# Patient Record
Sex: MALE | NOT HISPANIC OR LATINO | Employment: OTHER | URBAN - METROPOLITAN AREA
[De-identification: names, ages, dates, MRNs, and addresses within clinical notes are randomized per-mention and may not be internally consistent; named-entity substitution may affect disease eponyms.]

---

## 2018-03-06 ENCOUNTER — OFFICE VISIT (OUTPATIENT)
Dept: MEDICAL GROUP | Facility: PHYSICIAN GROUP | Age: 68
End: 2018-03-06
Payer: COMMERCIAL

## 2018-03-06 VITALS
OXYGEN SATURATION: 94 % | TEMPERATURE: 99.1 F | SYSTOLIC BLOOD PRESSURE: 132 MMHG | RESPIRATION RATE: 16 BRPM | WEIGHT: 274 LBS | HEIGHT: 73 IN | BODY MASS INDEX: 36.31 KG/M2 | DIASTOLIC BLOOD PRESSURE: 78 MMHG | HEART RATE: 64 BPM

## 2018-03-06 DIAGNOSIS — M25.552 HIP PAIN, ACUTE, LEFT: ICD-10-CM

## 2018-03-06 DIAGNOSIS — E66.9 OBESITY (BMI 35.0-39.9 WITHOUT COMORBIDITY): ICD-10-CM

## 2018-03-06 DIAGNOSIS — G89.29 CHRONIC MIDLINE LOW BACK PAIN WITH BILATERAL SCIATICA: ICD-10-CM

## 2018-03-06 DIAGNOSIS — Z13.9 ENCOUNTER FOR SCREENING: ICD-10-CM

## 2018-03-06 DIAGNOSIS — M54.42 CHRONIC MIDLINE LOW BACK PAIN WITH BILATERAL SCIATICA: ICD-10-CM

## 2018-03-06 DIAGNOSIS — M17.2 POST-TRAUMATIC OSTEOARTHRITIS OF BOTH KNEES: ICD-10-CM

## 2018-03-06 DIAGNOSIS — Z96.651 HISTORY OF TOTAL RIGHT KNEE REPLACEMENT: ICD-10-CM

## 2018-03-06 DIAGNOSIS — Z00.00 HEALTHCARE MAINTENANCE: ICD-10-CM

## 2018-03-06 DIAGNOSIS — Z12.5 SCREENING PSA (PROSTATE SPECIFIC ANTIGEN): ICD-10-CM

## 2018-03-06 DIAGNOSIS — Z12.11 COLON CANCER SCREENING: ICD-10-CM

## 2018-03-06 DIAGNOSIS — I10 ESSENTIAL HYPERTENSION: ICD-10-CM

## 2018-03-06 DIAGNOSIS — M54.41 CHRONIC MIDLINE LOW BACK PAIN WITH BILATERAL SCIATICA: ICD-10-CM

## 2018-03-06 PROBLEM — M54.40 CHRONIC MIDLINE LOW BACK PAIN WITH SCIATICA: Status: ACTIVE | Noted: 2018-03-06

## 2018-03-06 PROBLEM — Z96.659 HX OF TOTAL KNEE ARTHROPLASTY: Status: ACTIVE | Noted: 2018-03-06

## 2018-03-06 PROCEDURE — 99204 OFFICE O/P NEW MOD 45 MIN: CPT | Performed by: INTERNAL MEDICINE

## 2018-03-06 RX ORDER — LISINOPRIL 20 MG/1
20 TABLET ORAL 2 TIMES DAILY
COMMUNITY
End: 2018-03-06

## 2018-03-06 RX ORDER — NAPROXEN 375 MG/1
375 TABLET ORAL 2 TIMES DAILY WITH MEALS
Qty: 60 TAB | Refills: 0 | Status: SHIPPED | OUTPATIENT
Start: 2018-03-06 | End: 2018-03-10

## 2018-03-06 RX ORDER — LISINOPRIL AND HYDROCHLOROTHIAZIDE 20; 12.5 MG/1; MG/1
2 TABLET ORAL DAILY
Qty: 90 TAB | Refills: 3 | Status: SHIPPED | OUTPATIENT
Start: 2018-03-06 | End: 2019-02-03 | Stop reason: SDUPTHER

## 2018-03-06 ASSESSMENT — PATIENT HEALTH QUESTIONNAIRE - PHQ9: CLINICAL INTERPRETATION OF PHQ2 SCORE: 0

## 2018-03-06 NOTE — ASSESSMENT & PLAN NOTE
This is a chronic health problem that is not well controlled with current medications and lifestyle measures. He had multiple right knee procedures due to fall injuries during sports when he was young, which all lead to early osteoarthritis and total knee replacement in 2002 on the right side. After which he had relief of his Rt knee pain for more than 10 years but currently he started having recurrent pain on the right side. After a fall injury during [place] time he started experiencing pain in left knee 12/2017 . His fall injury on step ladder and direct trauma on left knee at that time. History taking over-the-counter Tylenol as needed but wants them to get fixed and requests for referral to orthopedics. He was previously following Dr. Keith orthopedist who is no longer practicing now at his office.

## 2018-03-06 NOTE — ASSESSMENT & PLAN NOTE
This is a chronic health problem that is uncontrolled with current medications and lifestyle measures. Had epidurals x 4/year for 6-7 years.   , [facility] . One time he received steroid shots which she feels well most effective for his pain and would prefer to have similar such targeted steroid injections instead of epidurals. But after the fall injury in December 2017 patient started experiencing back pain with radiating pain in both the thighs. Patient says that he had MRI of the back done with Dr. Keith at [facility] which we'll have to get the records before further imaging on his back.

## 2018-03-06 NOTE — ASSESSMENT & PLAN NOTE
This is a new problem which patient has started having after the fall injury in December 2017 and currently having difficulty moving around and lying on the left side while he sleeps.

## 2018-03-06 NOTE — LETTER
[facility]  Louise Muñoz M.D.  [address]  [place] [address]  Fax: [phone]   Authorization for Release/Disclosure of   Protected Health Information   Name: ALLIE VELAZQUEZ : 1950 SSN: [id number]   Address: [address] [place] [address]  [place] [address] Phone:    [phone] (home)    I authorize the entity listed below to release/disclose the PHI below to:   [facility]/Louise Muñoz M.D. and Louise Muñoz M.D.   Provider or Entity Name:     Address   City, State, Zip   Phone:      Fax:     Reason for request: continuity of care   Information to be released:    [  ] LAST COLONOSCOPY,  including any PATH REPORT and follow-up  [  ] LAST FIT/COLOGUARD RESULT [  ] LAST DEXA  [  ] LAST MAMMOGRAM  [  ] LAST PAP  [  ] LAST LABS [  ] RETINA EXAM REPORT  [  ] IMMUNIZATION RECORDS  [  ] Release all info      [  ] Check here and initial the line next to each item to release ALL health information INCLUDING  _____ Care and treatment for drug and / or alcohol abuse  _____ HIV testing, infection status, or AIDS  _____ Genetic Testing    DATES OF SERVICE OR TIME PERIOD TO BE DISCLOSED: _____________  I understand and acknowledge that:  * This Authorization may be revoked at any time by you in writing, except if your health information has already been used or disclosed.  * Your health information that will be used or disclosed as a result of you signing this authorization could be re-disclosed by the recipient. If this occurs, your re-disclosed health information may no longer be protected by State or Federal laws.  * You may refuse to sign this Authorization. Your refusal will not affect your ability to obtain treatment.  * This Authorization becomes effective upon signing and will  on (date) __________.      If no date is indicated, this Authorization will  one (1) year from the signature date.    Name: Allie Velazquez    Signature:   Date:     3/6/2018       PLEASE FAX  REQUESTED RECORDS BACK TO: [phone]

## 2018-03-06 NOTE — LETTER
[facility]  Louise Muñoz M.D.  [address]  [place] [address]  Fax: [phone]   Authorization for Release/Disclosure of   Protected Health Information   Name: ALLIE VELAZQUEZ : 1950 SSN: [id number]   Address: [address] [place] [address]  [place] [address] Phone:    [phone] (home)    I authorize the entity listed below to release/disclose the PHI below to:   [facility]/Louise Muñoz M.D. and Louise Muñoz M.D.   Provider or Entity Name:  [facility], [facility], [facility]   Phone:      Fax:     Reason for request: continuity of care   Information to be released:    [  ] LAST COLONOSCOPY,  including any PATH REPORT and follow-up  [  ] LAST FIT/COLOGUARD RESULT [  ] LAST DEXA  [  ] LAST MAMMOGRAM  [  ] LAST PAP  [  ] LAST LABS [  ] RETINA EXAM REPORT  [  ] IMMUNIZATION RECORDS  [  ] Release all info      [  ] Check here and initial the line next to each item to release ALL health information INCLUDING  _____ Care and treatment for drug and / or alcohol abuse  _____ HIV testing, infection status, or AIDS  _____ Genetic Testing    DATES OF SERVICE OR TIME PERIOD TO BE DISCLOSED: _____________  I understand and acknowledge that:  * This Authorization may be revoked at any time by you in writing, except if your health information has already been used or disclosed.  * Your health information that will be used or disclosed as a result of you signing this authorization could be re-disclosed by the recipient. If this occurs, your re-disclosed health information may no longer be protected by State or Federal laws.  * You may refuse to sign this Authorization. Your refusal will not affect your ability to obtain treatment.  * This Authorization becomes effective upon signing and will  on (date) __________.      If no date is indicated, this Authorization will  one (1) year from the signature date.    Name: Allie Velazquez    Signature:   Date:     3/6/2018       PLEASE  FAX REQUESTED RECORDS BACK TO: [phone]

## 2018-03-06 NOTE — ASSESSMENT & PLAN NOTE
He recently attended a health fair and had all the basic lab work done in February 2018. CMP within normal limits with creatinine at 1.2, AST 33, ALP 39. HBA1c 6.0, lipid panel showing total cholesterol 197, triglycerides 172, HDL 46, .    Refuses to take flu shot. At 53 yrs he had first colonoscopy done at that time normal. ?[facility] outpt clinic . He does not remember the doctor's name and we're unable to get the records for the report to be verified. Discussed with the patient that he will need a repeat colonoscopy to be done at 63 years of age which he refuses to have one at this time because he has to deal with all his knee pain, back pain at this time and is not interested to get the colonoscopy. Agreed for a fit test.

## 2018-03-06 NOTE — LETTER
[facility]  Louise Muñoz M.D.  [address]  [place] [address]  Fax: [phone]   Authorization for Release/Disclosure of   Protected Health Information   Name: ALLIE VELAZQUEZ : 1950 SSN: [id number]   Address: [address] [place] [address]  [place] [address] Phone:    [phone] (home)    I authorize the entity listed below to release/disclose the PHI below to:   [facility]/Louise Muñoz M.D. and Louise Muñoz M.D.   Provider or Entity Name:     Address   City, State, Zip   Phone:      Fax:     Reason for request: continuity of care   Information to be released:    [  ] LAST COLONOSCOPY,  including any PATH REPORT and follow-up  [  ] LAST FIT/COLOGUARD RESULT [  ] LAST DEXA  [  ] LAST MAMMOGRAM  [  ] LAST PAP  [  ] LAST LABS [  ] RETINA EXAM REPORT  [  ] IMMUNIZATION RECORDS  [  ] Release all info      [  ] Check here and initial the line next to each item to release ALL health information INCLUDING  _____ Care and treatment for drug and / or alcohol abuse  _____ HIV testing, infection status, or AIDS  _____ Genetic Testing    DATES OF SERVICE OR TIME PERIOD TO BE DISCLOSED: _____________  I understand and acknowledge that:  * This Authorization may be revoked at any time by you in writing, except if your health information has already been used or disclosed.  * Your health information that will be used or disclosed as a result of you signing this authorization could be re-disclosed by the recipient. If this occurs, your re-disclosed health information may no longer be protected by State or Federal laws.  * You may refuse to sign this Authorization. Your refusal will not affect your ability to obtain treatment.  * This Authorization becomes effective upon signing and will  on (date) __________.      If no date is indicated, this Authorization will  one (1) year from the signature date.    Name: Allie Velazquez    Signature:   Date:     3/6/2018       PLEASE FAX  REQUESTED RECORDS BACK TO: [phone]

## 2018-03-06 NOTE — ASSESSMENT & PLAN NOTE
This is a chronic health problem that is well controlled with current medications and lifestyle measures. 's/70's at home check. Denies any symptoms of headache, blurry vision, focal weakness. Currently on medication lisinopril/hydrochlorothiazide at 40/25 mg.

## 2018-03-06 NOTE — ASSESSMENT & PLAN NOTE
This is a chronic health problem that is well controlled with current medications and lifestyle measures. We do not have previous vital signs to compare his weight but currently his BMI is 36. Patient understands the importance of weight reduction at this time given his comorbidities and agrees to get the referral to help improvement  program.

## 2018-03-06 NOTE — LETTER
[facility]  Louise Muñoz M.D.  [address]  [place] [address]  Fax: [phone]   Authorization for Release/Disclosure of   Protected Health Information   Name: ALLIE VELAZQUEZ : 1950 SSN: [id number]   Address: [address] [place] [address]  [place] [address] Phone:    [phone] (home)    I authorize the entity listed below to release/disclose the PHI below to:   [facility]/Louise Muñoz M.D. and Louise Muñoz M.D.   Provider or Entity Name:     Address   City, State, Zip   Phone:      Fax:     Reason for request: continuity of care   Information to be released:    [  ] LAST COLONOSCOPY,  including any PATH REPORT and follow-up  [  ] LAST FIT/COLOGUARD RESULT [  ] LAST DEXA  [  ] LAST MAMMOGRAM  [  ] LAST PAP  [  ] LAST LABS [  ] RETINA EXAM REPORT  [  ] IMMUNIZATION RECORDS  [  ] Release all info      [  ] Check here and initial the line next to each item to release ALL health information INCLUDING  _____ Care and treatment for drug and / or alcohol abuse  _____ HIV testing, infection status, or AIDS  _____ Genetic Testing    DATES OF SERVICE OR TIME PERIOD TO BE DISCLOSED: _____________  I understand and acknowledge that:  * This Authorization may be revoked at any time by you in writing, except if your health information has already been used or disclosed.  * Your health information that will be used or disclosed as a result of you signing this authorization could be re-disclosed by the recipient. If this occurs, your re-disclosed health information may no longer be protected by State or Federal laws.  * You may refuse to sign this Authorization. Your refusal will not affect your ability to obtain treatment.  * This Authorization becomes effective upon signing and will  on (date) __________.      If no date is indicated, this Authorization will  one (1) year from the signature date.    Name: Allie Velazquez    Signature:   Date:     3/6/2018       PLEASE FAX  REQUESTED RECORDS BACK TO: [phone]

## 2018-03-06 NOTE — PROGRESS NOTES
CC:  To establish care with new PCP, knee pain.    HISTORY OF THE PRESENT ILLNESS: Patient is a 68 y.o. male. This pleasant patient is here today to establish care with new PCP, complaints of bilateral knee pain and other medical problems as mentioned in history of present illness below.    Health Maintenance: Completed      Hx of total knee arthroplasty  This is a chronic health problem that is not well controlled with current medications and lifestyle measures. He had multiple right knee procedures due to fall injuries during sports when he was young, which all lead to early osteoarthritis and total knee replacement in 2002 on the right side. After which he had relief of his Rt knee pain for more than 10 years but currently he started having recurrent pain on the right side. After a fall injury during [place] time he started experiencing pain in left knee 12/2017 . His fall injury on step ladder and direct trauma on left knee at that time. History taking over-the-counter Tylenol as needed but wants them to get fixed and requests for referral to orthopedics. He was previously following Dr. Keith orthopedist who is no longer practicing now at his office.    Chronic midline low back pain with sciatica  This is a chronic health problem that is uncontrolled with current medications and lifestyle measures. Had epidurals x 4/year for 6-7 years.   , [facility] . One time he received steroid shots which she feels well most effective for his pain and would prefer to have similar such targeted steroid injections instead of epidurals. But after the fall injury in December 2017 patient started experiencing back pain with radiating pain in both the thighs. Patient says that he had MRI of the back done with Dr. Keith at [facility] which we'll have to get the records before further imaging on his back.    Essential hypertension  This is a chronic health problem that is well controlled with current  medications and lifestyle measures. 's/70's at home check. Denies any symptoms of headache, blurry vision, focal weakness. Currently on medication lisinopril/hydrochlorothiazide at 40/25 mg.      Healthcare maintenance  He recently attended a health fair and had all the basic lab work done in February 2018. CMP within normal limits with creatinine at 1.2, AST 33, ALP 39. HBA1c 6.0, lipid panel showing total cholesterol 197, triglycerides 172, HDL 46, .    Refuses to take flu shot. At 53 yrs he had first colonoscopy done at that time normal. ?[facility] out[facility] clinic . He does not remember the doctor's name and we're unable to get the records for the report to be verified. Discussed with the patient that he will need a repeat colonoscopy to be done at 63 years of age which he refuses to have one at this time because he has to deal with all his knee pain, back pain at this time and is not interested to get the colonoscopy. Agreed for a fit test.     Obesity (BMI 35.0-39.9 without comorbidity) (HCC)  This is a chronic health problem that is well controlled with current medications and lifestyle measures. We do not have previous vital signs to compare his weight but currently his BMI is 36. Patient understands the importance of weight reduction at this time given his comorbidities and agrees to get the referral to help improvement  program.      Hip pain, acute, left  This is a new problem which patient has started having after the fall injury in December 2017 and currently having difficulty moving around and lying on the left side while he sleeps.    PHQ score 0, BMI within normal limits, no tobacco, no fall injuries    Allergies: Patient has no known allergies.    Current Outpatient Prescriptions Ordered in [facility]   Medication Sig Dispense Refill   • lisinopril-hydrochlorothiazide (PRINZIDE, ZESTORETIC) 20-12.5 MG per tablet Take 2 Tabs by mouth every day. 90 Tab 3     No current Epic-ordered  facility-administered medications on file.        Past Medical History:   Diagnosis Date   • Chronic midline low back pain with sciatica 3/6/2018   • Essential hypertension 3/6/2018   • Hx of total knee arthroplasty 03/06/2018   • Hypertension    • Rotator cuff arthropathy, right 2009    Metal in place.   • S/P total knee arthroplasty Rt side , 2002    He was told that the life expectancy of the arthroplasty is 15-20 yrs       No past surgical history on file.    Social History   Substance Use Topics   • Smoking status: Light Tobacco Smoker     Types: Cigars   • Smokeless tobacco: Never Used      Comment: 4 cigars/year   • Alcohol use Yes      Comment: Cocktails of 2 -3 drinks around 5-6x/week       Social History     Social History Narrative   • No narrative on file       Family History   Problem Relation Age of Onset   • No Known Problems Sister    • No Known Problems Maternal Grandmother    • No Known Problems Maternal Grandfather    • No Known Problems Paternal Grandmother    • No Known Problems Paternal Grandfather        ROS:     - Constitutional: Negative for fever, chills, unexpected weight change, and fatigue/generalized weakness.     - HEENT: Negative for headaches, vision changes, hearing changes, ear pain, ear discharge, rhinorrhea, sinus congestion, sore throat, and neck pain.      - Respiratory: Negative for cough, sputum production, chest congestion, dyspnea, wheezing, and crackles.      - Cardiovascular: Negative for chest pain, palpitations, orthopnea, and bilateral lower extremity edema.     - Gastrointestinal: Negative for heartburn, nausea, vomiting, abdominal pain, hematochezia, melena, diarrhea, constipation, and greasy/foul-smelling stools.     - Genitourinary: Negative for dysuria, polyuria, hematuria, pyuria, urinary urgency, and urinary incontinence.     - Musculoskeletal: Positive for bilateral knee joint pain, hip pain on the left side, low back pain. Negative for myalgias.    - Skin:  "Negative for rash, itching, cyanotic skin color change.     - Neurological: Negative for dizziness, tingling, tremors, focal sensory deficit, focal weakness and headaches.     - Endo/Heme/Allergies: Does not bruise/bleed easily.     - Psychiatric/Behavioral: Negative for depression, suicidal/homicidal ideation and memory loss.      Do not see any lab work done on Epic patient says he had it with above past PCP will get the records.     Exam: Blood pressure 132/78, pulse 64, temperature 37.3 °C (99.1 °F), resp. rate 16, height 1.854 m (6' 1\"), weight 124.3 kg (274 lb), SpO2 94 %. Body mass index is 36.15 kg/m².    General: Normal appearing. No distress.  HEENT: Normocephalic. Eyes conjunctiva clear lids without ptosis, pupils equal and reactive to light accommodation, ears normal shape and contour, canals are clear bilaterally, tympanic membranes are benign, nasal mucosa benign, oropharynx is without erythema, edema or exudates.   Neck: Supple without JVD or bruit. Thyroid is not enlarged.  Pulmonary: Clear to ausculation.  Normal effort. No rales, ronchi, or wheezing.  Cardiovascular: Regular rate and rhythm without murmur. Carotid and radial pulses are intact and equal bilaterally.  Abdomen: Soft, nontender, nondistended. Normal bowel sounds. Liver and spleen are not palpable  Neurologic: Grossly nonfocal  Lymph: No cervical, supraclavicular or axillary lymph nodes are palpable  Skin: Warm and dry.  No obvious lesions.  Musculoskeletal: Normal gait. No extremity cyanosis, clubbing, or edema.  Knee exam : Restricted range of motion of bilateral knee joints.  positive exam for Rt and Left Knee including (Rodney's, anterior/posterior drawer, patellar tendon tenderness to compression, quadriceps tendon tenderness to compression, tenderness to direct palpation of patella, tenderness to direct palpation of medial and lateral joint spaces, tenderness to direct palpation of medial and lateral collateral ligaments, " patellar mobility)  Left Hip Exam : Restricted range of motion on all movements, tenderness on palpation of the greater trochanter.  Back exam: Localized tenderness on palpation of lumbosacral spine in midline, straight leg raising test positive on left side at 45°.    Psych: Normal mood and affect. Alert and oriented x3. Judgment and insight is normal.      Please note that this dictation was created using voice recognition software. I have made every reasonable attempt to correct obvious errors, but I expect that there are errors of grammar and possibly content that I did not discover before finalizing the note.      Assessment/Plan  1. History of total right knee replacement  2. Post-traumatic osteoarthritis of both knees  Given previous history of right total knee replacement, recurrence of knee joint pain on his right side patient will need reevaluation by orthopedics, will give referral to orthopedics. Will do the imaging for his both the knees, give him when necessary naproxen and also check his vitamin D levels.  - REFERRAL TO ORTHOPEDICS  - VITAMIN D,25 HYDROXY; Future  - DX-KNEE 3 VIEWS LEFT; Future  - DX-KNEE 3 VIEWS RIGHT; Future  - naproxen (NAPROSYN) 375 MG Tab; Take 1 Tab by mouth 2 times a day, with meals.  Dispense: 60 Tab; Refill: 0    2. Chronic midline low back pain with bilateral sciatica  Given his previous history of repeated epidural injections, as mentioned in the history of present illness above patient is more happy with a steroid injections which worked well in the past. We will give referral to spine surgery for further management. We will evaluate with an x-ray of the hips covering the lower spine.  - DX-HIP-BILATERAL-WITH PELVIS-2 VIEWS; Future  - REFERRAL TO SPINE SURGERY  - naproxen (NAPROSYN) 375 MG Tab; Take 1 Tab by mouth 2 times a day, with meals.  Dispense: 60 Tab; Refill: 0    3. Essential hypertension  To continue the medications lisinopril/HCTZ at current dose of 40/25 mg.  Today his blood pressure regularly everyday and share it with me on phone for any changes in his current dosages. Patient's blood pressure in the office today is well controlled.  - lisinopril-hydrochlorothiazide (PRINZIDE, ZESTORETIC) 20-12.5 MG per tablet; Take 2 Tabs by mouth every day.  Dispense: 90 Tab; Refill: 3    4. Healthcare maintenance  6. Encounter for screening  - CBC WITH DIFFERENTIAL; Future    7. Screening PSA (prostate specific antigen)  - PROSTATE SPECIFIC AG SCREENING; Future    8. Colon cancer screening  - OCCULT BLOOD FECES IMMUNOASSAY; Future      9. Obesity (BMI 35.0-39.9 without comorbidity)  Pt educated on the increase of morbidity and mortality associated with excess weight including DM, Heart Disease, HTN, stroke, and sleep apnea.  Pt advised weight loss of 5% through reduced calorie, low fat diet and 150 mins of exercise a week  Recommend obesity clinic if patient is unsuccessful with weight loss. Will check his TSH levels.  - Patient identified as having weight management issue.  Appropriate orders and counseling given.  - REFERRAL TO [facility] IMPROVEMENT PROGRAMS (HIP) Services Requested: Weight Management Program; Reason for Visit: Overweight/Obesity; Future  - TSH WITH REFLEX TO FT4; Future      10. Hip pain, acute, left   Posttraumatic since December 2017, will do a basic imaging and use when necessary naproxen.  - DX-HIP-BILATERAL-WITH PELVIS-2 VIEWS; Future  - naproxen (NAPROSYN) 375 MG Tab; Take 1 Tab by mouth 2 times a day, with meals.  Dispense: 60 Tab; Refill: 0

## 2018-03-06 NOTE — LETTER
[facility]  Louise Muñoz M.D.  [address]  [place] [address]  Fax: [phone]   Authorization for Release/Disclosure of   Protected Health Information   Name: ALLIE VELAZQUEZ : 1950 SSN: [id number]   Address: [address] [place] [address]  [place] [address] Phone:    [phone] (home)    I authorize the entity listed below to release/disclose the PHI below to:   [facility]/Louise Muñoz M.D. and Louise Muñoz M.D.   Provider or Entity Name:     Address   City, State, Zip   Phone:      Fax:     Reason for request: continuity of care   Information to be released:    [  ] LAST COLONOSCOPY,  including any PATH REPORT and follow-up  [  ] LAST FIT/COLOGUARD RESULT [  ] LAST DEXA  [  ] LAST MAMMOGRAM  [  ] LAST PAP  [  ] LAST LABS [  ] RETINA EXAM REPORT  [  ] IMMUNIZATION RECORDS  [  ] Release all info      [  ] Check here and initial the line next to each item to release ALL health information INCLUDING  _____ Care and treatment for drug and / or alcohol abuse  _____ HIV testing, infection status, or AIDS  _____ Genetic Testing    DATES OF SERVICE OR TIME PERIOD TO BE DISCLOSED: _____________  I understand and acknowledge that:  * This Authorization may be revoked at any time by you in writing, except if your health information has already been used or disclosed.  * Your health information that will be used or disclosed as a result of you signing this authorization could be re-disclosed by the recipient. If this occurs, your re-disclosed health information may no longer be protected by State or Federal laws.  * You may refuse to sign this Authorization. Your refusal will not affect your ability to obtain treatment.  * This Authorization becomes effective upon signing and will  on (date) __________.      If no date is indicated, this Authorization will  one (1) year from the signature date.    Name: Allie Velazquez    Signature:   Date:     3/6/2018       PLEASE FAX  REQUESTED RECORDS BACK TO: [phone]

## 2018-03-08 ENCOUNTER — APPOINTMENT (OUTPATIENT)
Dept: RADIOLOGY | Facility: IMAGING CENTER | Age: 68
End: 2018-03-08
Attending: INTERNAL MEDICINE
Payer: COMMERCIAL

## 2018-03-08 ENCOUNTER — HOSPITAL ENCOUNTER (OUTPATIENT)
Facility: MEDICAL CENTER | Age: 68
End: 2018-03-08
Attending: INTERNAL MEDICINE
Payer: COMMERCIAL

## 2018-03-08 ENCOUNTER — HOSPITAL ENCOUNTER (OUTPATIENT)
Dept: LAB | Facility: MEDICAL CENTER | Age: 68
End: 2018-03-08
Attending: INTERNAL MEDICINE
Payer: COMMERCIAL

## 2018-03-08 DIAGNOSIS — Z12.5 SCREENING PSA (PROSTATE SPECIFIC ANTIGEN): ICD-10-CM

## 2018-03-08 DIAGNOSIS — M17.2 POST-TRAUMATIC OSTEOARTHRITIS OF BOTH KNEES: ICD-10-CM

## 2018-03-08 DIAGNOSIS — Z12.11 COLON CANCER SCREENING: ICD-10-CM

## 2018-03-08 DIAGNOSIS — E66.9 OBESITY (BMI 35.0-39.9 WITHOUT COMORBIDITY): ICD-10-CM

## 2018-03-08 DIAGNOSIS — G89.29 CHRONIC MIDLINE LOW BACK PAIN WITH BILATERAL SCIATICA: ICD-10-CM

## 2018-03-08 DIAGNOSIS — M54.42 CHRONIC MIDLINE LOW BACK PAIN WITH BILATERAL SCIATICA: ICD-10-CM

## 2018-03-08 DIAGNOSIS — M25.552 HIP PAIN, ACUTE, LEFT: ICD-10-CM

## 2018-03-08 DIAGNOSIS — Z13.9 ENCOUNTER FOR SCREENING: ICD-10-CM

## 2018-03-08 DIAGNOSIS — M54.41 CHRONIC MIDLINE LOW BACK PAIN WITH BILATERAL SCIATICA: ICD-10-CM

## 2018-03-08 LAB
25(OH)D3 SERPL-MCNC: 20 NG/ML (ref 30–100)
BASOPHILS # BLD AUTO: 1.8 % (ref 0–1.8)
BASOPHILS # BLD: 0.12 K/UL (ref 0–0.12)
EOSINOPHIL # BLD AUTO: 0.6 K/UL (ref 0–0.51)
EOSINOPHIL NFR BLD: 9 % (ref 0–6.9)
ERYTHROCYTE [DISTWIDTH] IN BLOOD BY AUTOMATED COUNT: 43.7 FL (ref 35.9–50)
HCT VFR BLD AUTO: 49.8 % (ref 42–52)
HGB BLD-MCNC: 17 G/DL (ref 14–18)
IMM GRANULOCYTES # BLD AUTO: 0.02 K/UL (ref 0–0.11)
IMM GRANULOCYTES NFR BLD AUTO: 0.3 % (ref 0–0.9)
LYMPHOCYTES # BLD AUTO: 2.53 K/UL (ref 1–4.8)
LYMPHOCYTES NFR BLD: 37.9 % (ref 22–41)
MCH RBC QN AUTO: 32.8 PG (ref 27–33)
MCHC RBC AUTO-ENTMCNC: 34.1 G/DL (ref 33.7–35.3)
MCV RBC AUTO: 96.1 FL (ref 81.4–97.8)
MONOCYTES # BLD AUTO: 0.74 K/UL (ref 0–0.85)
MONOCYTES NFR BLD AUTO: 11.1 % (ref 0–13.4)
NEUTROPHILS # BLD AUTO: 2.67 K/UL (ref 1.82–7.42)
NEUTROPHILS NFR BLD: 39.9 % (ref 44–72)
NRBC # BLD AUTO: 0 K/UL
NRBC BLD-RTO: 0 /100 WBC
PLATELET # BLD AUTO: 287 K/UL (ref 164–446)
PMV BLD AUTO: 10.2 FL (ref 9–12.9)
PSA SERPL-MCNC: 2.29 NG/ML (ref 0–4)
RBC # BLD AUTO: 5.18 M/UL (ref 4.7–6.1)
TSH SERPL DL<=0.005 MIU/L-ACNC: 2.74 UIU/ML (ref 0.38–5.33)
WBC # BLD AUTO: 6.7 K/UL (ref 4.8–10.8)

## 2018-03-08 PROCEDURE — 85025 COMPLETE CBC W/AUTO DIFF WBC: CPT

## 2018-03-08 PROCEDURE — 84443 ASSAY THYROID STIM HORMONE: CPT

## 2018-03-08 PROCEDURE — 73521 X-RAY EXAM HIPS BI 2 VIEWS: CPT | Mod: TC | Performed by: INTERNAL MEDICINE

## 2018-03-08 PROCEDURE — 36415 COLL VENOUS BLD VENIPUNCTURE: CPT

## 2018-03-08 PROCEDURE — 82274 ASSAY TEST FOR BLOOD FECAL: CPT

## 2018-03-08 PROCEDURE — 73562 X-RAY EXAM OF KNEE 3: CPT | Mod: TC,LT | Performed by: INTERNAL MEDICINE

## 2018-03-08 PROCEDURE — 73562 X-RAY EXAM OF KNEE 3: CPT | Mod: TC,RT | Performed by: INTERNAL MEDICINE

## 2018-03-08 PROCEDURE — 84153 ASSAY OF PSA TOTAL: CPT

## 2018-03-08 PROCEDURE — 82306 VITAMIN D 25 HYDROXY: CPT

## 2018-03-09 ENCOUNTER — TELEPHONE (OUTPATIENT)
Dept: MEDICAL GROUP | Facility: PHYSICIAN GROUP | Age: 68
End: 2018-03-09

## 2018-03-09 DIAGNOSIS — E55.9 VITAMIN D DEFICIENCY: ICD-10-CM

## 2018-03-09 LAB — HEMOCCULT STL QL IA: POSITIVE

## 2018-03-09 RX ORDER — ERGOCALCIFEROL 1.25 MG/1
50000 CAPSULE ORAL
Qty: 12 CAP | Refills: 3 | Status: SHIPPED | OUTPATIENT
Start: 2018-03-09 | End: 2018-11-09 | Stop reason: SDUPTHER

## 2018-03-10 ENCOUNTER — TELEPHONE (OUTPATIENT)
Dept: MEDICAL GROUP | Facility: PHYSICIAN GROUP | Age: 68
End: 2018-03-10

## 2018-03-10 DIAGNOSIS — G89.29 CHRONIC MIDLINE LOW BACK PAIN WITH BILATERAL SCIATICA: ICD-10-CM

## 2018-03-10 DIAGNOSIS — E55.9 VITAMIN D DEFICIENCY: ICD-10-CM

## 2018-03-10 DIAGNOSIS — Z96.651 HISTORY OF TOTAL RIGHT KNEE REPLACEMENT: ICD-10-CM

## 2018-03-10 DIAGNOSIS — M25.552 HIP PAIN, ACUTE, LEFT: ICD-10-CM

## 2018-03-10 DIAGNOSIS — Z12.11 SCREENING FOR COLON CANCER: ICD-10-CM

## 2018-03-10 DIAGNOSIS — M54.42 CHRONIC MIDLINE LOW BACK PAIN WITH BILATERAL SCIATICA: ICD-10-CM

## 2018-03-10 DIAGNOSIS — M54.41 CHRONIC MIDLINE LOW BACK PAIN WITH BILATERAL SCIATICA: ICD-10-CM

## 2018-03-10 DIAGNOSIS — M17.2 POST-TRAUMATIC OSTEOARTHRITIS OF BOTH KNEES: ICD-10-CM

## 2018-03-10 RX ORDER — CYCLOBENZAPRINE HCL 10 MG
10 TABLET ORAL 3 TIMES DAILY PRN
Qty: 60 TAB | Refills: 1 | Status: SHIPPED | OUTPATIENT
Start: 2018-03-10 | End: 2019-06-10

## 2018-03-10 NOTE — TELEPHONE ENCOUNTER
Called Pt cell phone number [phone] after calling his land-line and spoke to wife. But it went to voice message on his cell and I left a detailed voice message regarding his positive FIT test and him stopping his Naproxen,sent alternative non NSAID medication for his pain and counseled on intra articular steroid injection for knee pain to schedule with me or Orthopedics. Gave referral to GI for colonoscopy. Also sent his vitamin D medication given his vitamin D deficiency. Request my MA to convey him again the same.   Thanks,   Louise Muñoz M.D.

## 2018-03-12 ENCOUNTER — TELEPHONE (OUTPATIENT)
Dept: MEDICAL GROUP | Facility: PHYSICIAN GROUP | Age: 68
End: 2018-03-12

## 2018-03-12 NOTE — TELEPHONE ENCOUNTER
----- Message from Louise Muñoz M.D. sent at 3/10/2018  3:48 PM PST -----  I have reviewed your Hip Xrays which shows degenerative changes , Rt knee X rays shows no abnormality in your replacement arthroplasty, Lt knee X ray is abnormal for Calcification of Cartilage and Osteoarthritis spurs. As you await for Orthopedics referral which might give an answer if they will plan Replacement of left knee too , will be decided by them.   Louise Muñoz M.D.

## 2018-03-12 NOTE — TELEPHONE ENCOUNTER
----- Message from Louise Muñoz M.D. sent at 3/10/2018  8:25 AM PST -----  - Your FIT test is positive for blood. I would strongly recommend you for getting the colonoscopy which you are due to see where you are bleeding from and fix it. Given referral to GI for colonoscopy. Call if you have any questions.   - Stop taking Naproxen which will worsen the problem. Will give you alternative for Steroid injection into the knee joint from Orthopedics Or schedule appointment with me for the same. Can take Tramadol for pain instead of Naproxen for which you will need to sign the pain contract with our clinic for me to prescribe tramadol to you.   Louise Muñoz M.D.

## 2018-03-12 NOTE — TELEPHONE ENCOUNTER
----- Message from Louise Muñoz M.D. sent at 3/9/2018 12:35 PM PST -----  All your lab work including TSH, PSA, CBC are normal. Your vitamin D levels are low at 20 and he would require high-dose of vitamin D which I'm going to send to your pharmacy is supposed to take this once a week for at least 3 months.  Louise Muñoz M.D.

## 2018-05-24 ENCOUNTER — TELEPHONE (OUTPATIENT)
Dept: MEDICAL GROUP | Facility: PHYSICIAN GROUP | Age: 68
End: 2018-05-24

## 2018-05-24 NOTE — TELEPHONE ENCOUNTER
Future Appointments       Provider Department Center    5/25/2018 1:40 PM Louise Muñoz M.D. [facility]        ESTABLISHED PATIENT PRE-VISIT PLANNING     Note: Patient will not be contacted if there is no indication to call.     1.  Reviewed notes from the last few office visits within the medical group: Yes    2.  If any orders were placed at last visit or intended to be done for this visit (i.e. 6 mos follow-up), do we have Results/Consult Notes?        •  Labs - Labs ordered, completed on 03/08/18 and results are in chart.       •  Imaging - Imaging ordered, completed and results are in chart.       •  Referrals - Referral ordered, patient has NOT been seen.    3. Is this appointment scheduled as a Hospital Follow-Up? No    4.  Immunizations were updated in Epic using WebIZ?: No WebIZ record       •  Web Iz Recommendations: PREVNAR (PCV13)  and TDAP    5.  Patient is due for the following Health Maintenance Topics:   Health Maintenance Due   Topic Date Due   • IMM DTaP/Tdap/Td Vaccine (1 - Tdap) 01/24/1968   • COLONOSCOPY  01/24/1999   • IMM PNEUMOCOCCAL  (1 of 2 - PCV13) 01/24/2014       6.  MDX printed for Provider? NO INS [facility]    7.  Patient was informed to arrive 15 min prior to their scheduled appointment and bring in their medication bottles. Confirmed through automated call

## 2018-05-25 ENCOUNTER — OFFICE VISIT (OUTPATIENT)
Dept: MEDICAL GROUP | Facility: PHYSICIAN GROUP | Age: 68
End: 2018-05-25
Payer: COMMERCIAL

## 2018-05-25 VITALS
OXYGEN SATURATION: 96 % | HEART RATE: 72 BPM | SYSTOLIC BLOOD PRESSURE: 128 MMHG | TEMPERATURE: 98.9 F | DIASTOLIC BLOOD PRESSURE: 76 MMHG

## 2018-05-25 DIAGNOSIS — I10 ESSENTIAL HYPERTENSION: ICD-10-CM

## 2018-05-25 DIAGNOSIS — M17.2 POST-TRAUMATIC OSTEOARTHRITIS OF BOTH KNEES: ICD-10-CM

## 2018-05-25 DIAGNOSIS — J30.1 SEASONAL ALLERGIC RHINITIS DUE TO POLLEN: ICD-10-CM

## 2018-05-25 DIAGNOSIS — Z96.651 HISTORY OF TOTAL RIGHT KNEE REPLACEMENT: ICD-10-CM

## 2018-05-25 PROCEDURE — 99214 OFFICE O/P EST MOD 30 MIN: CPT | Performed by: INTERNAL MEDICINE

## 2018-05-25 RX ORDER — TRIAMCINOLONE ACETONIDE 40 MG/ML
40 INJECTION, SUSPENSION INTRA-ARTICULAR; INTRAMUSCULAR ONCE
Status: COMPLETED | OUTPATIENT
Start: 2018-05-25 | End: 2018-05-25

## 2018-05-25 RX ADMIN — TRIAMCINOLONE ACETONIDE 40 MG: 40 INJECTION, SUSPENSION INTRA-ARTICULAR; INTRAMUSCULAR at 14:16

## 2018-05-25 NOTE — ASSESSMENT & PLAN NOTE
This is a chronic health problem that is uncontrolled with current medications and lifestyle measures. Already had Rt knee in the past.   Off Naproxen as positive FIT test. Intrarticular steroid has waned off.  [facility] has planned for MRI in June 2018 and later scheduling for surgery.

## 2018-05-25 NOTE — ASSESSMENT & PLAN NOTE
This is a chronic health problem that is well controlled with current medications and lifestyle measures. Blood pressure in office rate 128/76, currently on medication lisinopril/HCTZ 20/12.5 mg 2 tablets daily

## 2018-05-25 NOTE — ASSESSMENT & PLAN NOTE
This is a chronic health problem that is uncontrolled with current medications and lifestyle measures. Has Kenalog shots every year . Due for one at that time.

## 2018-05-25 NOTE — PROGRESS NOTES
Subjective:   Chief Complaint/History of Present Illness:  Oliverio Velazquez is a 68 y.o. male established patient who presents today to discuss on his allergies and need of Kenalog shot.    Seasonal allergic rhinitis due to pollen  This is a chronic health problem that is uncontrolled with current medications and lifestyle measures. Has Kenalog shots every year . Due for one at that time.    Post-traumatic osteoarthritis of both knees  This is a chronic health problem that is uncontrolled with current medications and lifestyle measures. Already had Rt knee in the past.   Off Naproxen as positive FIT test. Intrarticular steroid has waned off.  [facility] has planned for MRI in June 2018 and later scheduling for surgery.    Essential hypertension  This is a chronic health problem that is well controlled with current medications and lifestyle measures. Blood pressure in office rate 128/76, currently on medication lisinopril/HCTZ 20/12.5 mg 2 tablets daily      PHQ score 0, BMI > 36 , current sometime tobacco not ready to quit, no fall injuries uses cane for ambulation due to knee pain.    Patient Active Problem List    Diagnosis Date Noted   • Seasonal allergic rhinitis due to pollen 05/25/2018   • Hx of total knee arthroplasty 03/06/2018   • Chronic midline low back pain with sciatica 03/06/2018   • Essential hypertension 03/06/2018   • Healthcare maintenance 03/06/2018   • Obesity (BMI 35.0-39.9 without comorbidity) ([facility]) 03/06/2018   • History of total right knee replacement 03/06/2018   • Colon cancer screening 03/06/2018   • Post-traumatic osteoarthritis of both knees 03/06/2018   • Hip pain, acute, left 03/06/2018       Additional History:   Allergies:    Patient has no known allergies.     Current Medications:     Current Outpatient Prescriptions   Medication Sig Dispense Refill   • Lido-Capsaicin-Men-Methyl Sal 0.5-0.035-5-20 % Patch 1 Patch by Apply externally route 1 time daily as needed. 15 Patch 3   •  cyclobenzaprine (FLEXERIL) 10 MG Tab Take 1 Tab by mouth 3 times a day as needed. 60 Tab 1   • vitamin D, Ergocalciferol, (DRISDOL) 16107 units Cap capsule Take 1 Cap by mouth every 7 days. 12 Cap 3   • lisinopril-hydrochlorothiazide (PRINZIDE, ZESTORETIC) 20-12.5 MG per tablet Take 2 Tabs by mouth every day. 90 Tab 3     Current Facility-Administered Medications   Medication Dose Route Frequency Provider Last Rate Last Dose   • triamcinolone acetonide (KENALOG-40) injection 40 mg  40 mg Intramuscular Once Louise Muñoz M.D.            Social History:     Social History   Substance Use Topics   • Smoking status: Light Tobacco Smoker     Types: Cigars   • Smokeless tobacco: Never Used      Comment: 4 cigars/year   • Alcohol use Yes      Comment: Cocktails of 2 -3 drinks around 5-6x/week       ROS:   Constitutional: Denies fevers or chills  Eyes: Denies changes in vision  Ears/Nose/Throat/Mouth: Denies nasal congestion or sore throat   Cardiovascular: Denies chest pain or palpitations   Respiratory: Denies shortness of breath , Denies cough  Gastrointestinal/Hepatic: Denies abd pain, nausea, vomiting   Genitourinary: Denies dysuria or frequency  Musculoskeletal/Rheum: Positive for left knee joint pain.  Neurological: Denies headache  Psychiatric: Denies mood disorder   Endocrine: Denies hx of diabetes or thyroid dysfunction  Heme/Oncology/Lymph Nodes: Denies weight changes or enlarged LNs. Allergic/Immunologic: Denies hx of allergies        Objective:     PHYSICAL EXAM  VITAL SIGNS:   /76   Pulse 72   Temp 37.2 °C (98.9 °F)   SpO2 96%   Constitutional: Well developed, Well nourished, No acute distress, Non-toxic appearance.    HENT: Normocephalic, Atraumatic, Bilateral external ears normal, Oropharynx moist, No oral exudates, Nose normal.  Positive for right upper molar tooth caries.  Eyes: PERRLA, EOMI, Conjunctiva normal, No discharge.    Neck: Normal range of motion, No tenderness, Supple, No stridor.     Lymphatic: No lymphadenopathy noted.    Cardiovascular: Regular rate and rhythm,  No murmurs, No rubs, No gallops.    Thorax & Lungs: Normal breath sounds, No respiratory distress, No wheezing, No chest tenderness.    Abdomen: Bowel sounds normal, Soft, No tenderness, No masses, No pulsatile masses.    Skin: Warm, Dry, No erythema, No rash.    Back: No tenderness, No CVA tenderness.   Extremities: Intact distal pulses, No edema, No tenderness, No cyanosis, No clubbing.    Left knee exam : No tenderness/erythema. Restricted range of movements due to pain. Has Knee brace in place.  Musculoskeletal: Good range of motion in all major joints. No tenderness to palpation or major deformities noted.    Neurologic: Alert & oriented x 3, Normal motor function, Normal sensory function, No focal deficits noted.    Psychiatric: Affect normal, Judgment normal, Mood normal.     Health Maintenance:     - Completed    Imaging/Labs:     - Reviewed and discussed with the patient    Assessment and Plan:   1. Seasonal allergic rhinitis due to pollen  Uncontrolled, given seasonal change exposure to Pollen and patient located nearby towards gets the shot every year. Will give him the Kenalog shot in the office today.  - triamcinolone acetonide (KENALOG-40) injection 40 mg; 1 mL by Intramuscular route Once.    2. History of total right knee replacement  3. Post-traumatic osteoarthritis of both knees  Follow-up care with [facility], MRI of left knee scheduled in June 2018. Soon after the MRI he'll be scheduled for left knee replacement surgery. Given instructions on possible need of dental procedure because of kidney stones as mentioned and physical exam, given instructions that he will need good antibiotic coverage as he has hardware in his body which patient understood and agreed and will be visiting the dentist soon.  Recently stopped naproxen because his FIT test was positive, will give him lidocaine patches.  -  Lido-Capsaicin-Men-Methyl Sal 0.5-0.035-5-20 % Patch; 1 Patch by Apply externally route 1 time daily as needed.  Dispense: 15 Patch; Refill: 3    4. Essential hypertension  Stable, well controlled continue the current medication lisinopril/HCTZ at 40/25 mg daily.      RTC: 6 months    PLEASE NOTE: This dictation was created using voice recognition software. I have made every reasonable attempt to correct obvious errors, but I expect that there are errors of grammar and possibly content that I did not discover before finalizing the note.

## 2018-11-09 DIAGNOSIS — E55.9 VITAMIN D DEFICIENCY: ICD-10-CM

## 2018-11-11 RX ORDER — ERGOCALCIFEROL 1.25 MG/1
CAPSULE ORAL
Qty: 12 CAP | Refills: 0 | Status: SHIPPED | OUTPATIENT
Start: 2018-11-11 | End: 2019-06-10

## 2019-02-01 DIAGNOSIS — E55.9 VITAMIN D DEFICIENCY: ICD-10-CM

## 2019-02-01 RX ORDER — ERGOCALCIFEROL 1.25 MG/1
CAPSULE ORAL
Refills: 0 | OUTPATIENT
Start: 2019-02-01

## 2019-02-01 NOTE — TELEPHONE ENCOUNTER
Was the patient seen in the last year in this department? Yes    Does patient have an active prescription for medications requested? No     Received Request Via: Pharmacy    Pt met protocol?: Yes     Last OV 05/2018    Last Vit D lab done 03/08/2018 with a value of 20

## 2019-02-01 NOTE — TELEPHONE ENCOUNTER
Dr Muñoz- You started pt on 3/18 at mentioned pt should be on it for at least 3 months. Is this something you wanted pt to continue or just retest?

## 2019-02-02 NOTE — TELEPHONE ENCOUNTER
Please call the patient and let him know that he will need to take over-the-counter vitamin D 2000 units daily,    Please schedule the patient for a follow-up visit as I did not see him after May 2018.  Will recheck the lab work before refilling the vitamin D again.

## 2019-02-03 DIAGNOSIS — I10 ESSENTIAL HYPERTENSION: ICD-10-CM

## 2019-02-04 RX ORDER — LISINOPRIL AND HYDROCHLOROTHIAZIDE 20; 12.5 MG/1; MG/1
TABLET ORAL
Qty: 180 TAB | Refills: 1 | Status: SHIPPED | OUTPATIENT
Start: 2019-02-04 | End: 2019-09-26 | Stop reason: SDUPTHER

## 2019-02-04 NOTE — TELEPHONE ENCOUNTER
Was the patient seen in the last year in this department? Yes    Does patient have an active prescription for medications requested? No     Received Request Via: Pharmacy      Pt met protocol?: Yes    OV 5/18    BP Readings from Last 1 Encounters:   05/25/18 128/76

## 2019-06-10 ENCOUNTER — OFFICE VISIT (OUTPATIENT)
Dept: URGENT CARE | Facility: PHYSICIAN GROUP | Age: 69
End: 2019-06-10
Payer: COMMERCIAL

## 2019-06-10 VITALS
HEIGHT: 73 IN | OXYGEN SATURATION: 94 % | SYSTOLIC BLOOD PRESSURE: 106 MMHG | DIASTOLIC BLOOD PRESSURE: 64 MMHG | RESPIRATION RATE: 18 BRPM | TEMPERATURE: 98.9 F | HEART RATE: 86 BPM | BODY MASS INDEX: 35.12 KG/M2 | WEIGHT: 265 LBS

## 2019-06-10 DIAGNOSIS — J30.1 SEASONAL ALLERGIC RHINITIS DUE TO POLLEN: ICD-10-CM

## 2019-06-10 PROCEDURE — 99213 OFFICE O/P EST LOW 20 MIN: CPT | Performed by: PHYSICIAN ASSISTANT

## 2019-06-10 RX ORDER — TRIAMCINOLONE ACETONIDE 40 MG/ML
40 INJECTION, SUSPENSION INTRA-ARTICULAR; INTRAMUSCULAR ONCE
Status: COMPLETED | OUTPATIENT
Start: 2019-06-10 | End: 2019-06-10

## 2019-06-10 RX ADMIN — TRIAMCINOLONE ACETONIDE 40 MG: 40 INJECTION, SUSPENSION INTRA-ARTICULAR; INTRAMUSCULAR at 14:45

## 2019-06-10 NOTE — PROGRESS NOTES
Chief Complaint   Patient presents with   • Allergic Rhinitis       HISTORY OF PRESENT ILLNESS: Patient is a 69 y.o. male who presents today for request for kenalog shot.   He states that has had this before and works very well for his allergies. He states this weekend his symptoms particularly worsened. This includes watery eyes, itching and drainage.   He denies having any fevers, chills, discolored drainage, productive cough, SOB.  He states that OTC anthistamines do not work for him and also do not agree with his blood pressure.   No hx of DM.       Patient Active Problem List    Diagnosis Date Noted   • Seasonal allergic rhinitis due to pollen 05/25/2018   • Hx of total knee arthroplasty 03/06/2018   • Chronic midline low back pain with sciatica 03/06/2018   • Essential hypertension 03/06/2018   • Healthcare maintenance 03/06/2018   • Obesity (BMI 35.0-39.9 without comorbidity) ([facility]) 03/06/2018   • History of total right knee replacement 03/06/2018   • Colon cancer screening 03/06/2018   • Post-traumatic osteoarthritis of both knees 03/06/2018   • Hip pain, acute, left 03/06/2018       Allergies:Patient has no known allergies.    Current Outpatient Prescriptions Ordered in [facility]   Medication Sig Dispense Refill   • lisinopril-hydrochlorothiazide (PRINZIDE, ZESTORETIC) 20-12.5 MG per tablet TAKE TWO TABLETS BY MOUTH DAILY 180 Tab 1     Current Facility-Administered Medications Ordered in Epic   Medication Dose Route Frequency Provider Last Rate Last Dose   • triamcinolone acetonide (KENALOG-40) injection 40 mg  40 mg Intramuscular Once Simran Santiago, P.A.-C.           Past Medical History:   Diagnosis Date   • Chronic midline low back pain with sciatica 3/6/2018   • Essential hypertension 3/6/2018   • Hx of total knee arthroplasty 03/06/2018   • Hypertension    • Rotator cuff arthropathy, right 2009    Metal in place.   • S/P total knee arthroplasty Rt side , 2002    He was told that the life expectancy of  "the arthroplasty is 15-20 yrs       Social History   Substance Use Topics   • Smoking status: Light Tobacco Smoker     Types: Cigars   • Smokeless tobacco: Never Used      Comment: 4 cigars/year   • Alcohol use Yes      Comment: Cocktails of 2 -3 drinks around 5-6x/week       Family Status   Relation Status   • Mo  at age 84        ?Blood disorder on treatment   • Fa  at age 74        PE after Knee surgery   • Sis Alive, age 73y   • MGMo  at age 90        Old age   • MGFa  at age 90        Old age   • PGMo  at age 90        Obese   • PGFa  at age 94        healthy , old age     Family History   Problem Relation Age of Onset   • No Known Problems Sister    • No Known Problems Maternal Grandmother    • No Known Problems Maternal Grandfather    • No Known Problems Paternal Grandmother    • No Known Problems Paternal Grandfather        ROS:  Review of Systems   Constitutional: Negative for fever, chills, weight loss and malaise/fatigue.   HENT: SEE HPI  Eyes: Negative for blurred vision.   Respiratory: Negative for cough, sputum production, shortness of breath and wheezing.    Cardiovascular: Negative for chest pain, palpitations, orthopnea and leg swelling.   Gastrointestinal: Negative for heartburn, nausea, vomiting and abdominal pain.       Exam:  /64 (BP Location: Left arm, Patient Position: Sitting, BP Cuff Size: Large adult)   Pulse 86   Temp 37.2 °C (98.9 °F) (Temporal)   Resp 18   Ht 1.854 m (6' 1\")   Wt 120.2 kg (265 lb)   SpO2 94%   General:  Well nourished, well developed male in NAD  Eyes: PERRLA, EOM within normal limits, no conjunctival injection, no scleral icterus, visual fields and acuity grossly intact.  Ears: Normal shape and symmetry, no tenderness, no discharge. External canals are without any significant edema or erythema. Tympanic membranes are without any inflammation, no effusion. Gross auditory acuity is intact  Nose: Symmetrical, " sinuses without tenderness, clear rhinorrhea/boggy turbinates.   Mouth: reasonable hygiene, no erythema exudates or tonsillar enlargement.  Neck: no masses, range of motion within normal limits, no tracheal deviation. No lymphadenopathy  Pulmonary: Normal respiratory effort, no wheezes, crackles, or rhonchi.  Cardiovascular: regular rate and rhythm without murmurs, rubs, or gallops.  Skin: No visible rashes or lesion. Warm, pink, dry.   Extremities: no clubbing, cyanosis, or edema.  Neuro: A&O x 3. Speech normal/clear.  Normal gait.         Assessment/Plan:  1. Seasonal allergic rhinitis due to pollen  triamcinolone acetonide (KENALOG-40) injection 40 mg       -discussed risks and benefits of steroids.    -patient is familiar with this medication and has had it several times.  He has benign exam without evidence of infection today.   No hx of diabetes.    -Kenalog shot given in clinic, patient tolerated well.        Supportive care, differential diagnoses, and indications for immediate follow-up discussed with patient.   Pathogenesis of diagnosis discussed including typical length and natural progression.   Instructed to return to clinic or nearest emergency department for any change in condition, further concerns, or worsening of symptoms.  Patient states understanding of the plan of care and discharge instructions.      Simran Santiago P.A.-C.

## 2019-09-26 DIAGNOSIS — I10 ESSENTIAL HYPERTENSION: ICD-10-CM

## 2019-09-26 RX ORDER — LISINOPRIL AND HYDROCHLOROTHIAZIDE 20; 12.5 MG/1; MG/1
TABLET ORAL
Qty: 60 TAB | Refills: 0 | Status: SHIPPED | OUTPATIENT
Start: 2019-09-26 | End: 2019-10-23 | Stop reason: SDUPTHER

## 2019-09-26 NOTE — TELEPHONE ENCOUNTER
*PT NEEDS TO MAKE AN APPT AND GET LABS DONE*NO LABS FOUND  Was the patient seen in the last year in this department? Yes    Does patient have an active prescription for medications requested? No     Received Request Via: Pharmacy      Pt met protocol?: NO  LAST OV 05/25/2018   BP Readings from Last 1 Encounters:   06/10/19 106/64     No results found for: CHOLSTRLTOT, LDL, HDL, TRIGLYCERIDE    No results found for: SODIUM, POTASSIUM, CHLORIDE, CO2, GLUCOSE, BUN, CREATININE, BUNCREATRAT, GLOMRATE  No results found for: ALKPHOSPHAT, ASTSGOT, ALTSGPT, TBILIRUBIN     No results found for: SODIUM, POTASSIUM, CHLORIDE, CO2, GLUCOSE, BUN, CREATININE, BUNCREATRAT, GLOMRATE

## 2019-10-23 DIAGNOSIS — I10 ESSENTIAL HYPERTENSION: ICD-10-CM

## 2019-10-23 RX ORDER — LISINOPRIL AND HYDROCHLOROTHIAZIDE 20; 12.5 MG/1; MG/1
TABLET ORAL
Qty: 60 TAB | Refills: 0 | Status: SHIPPED | OUTPATIENT
Start: 2019-10-23 | End: 2019-11-08 | Stop reason: SDUPTHER

## 2019-10-23 NOTE — TELEPHONE ENCOUNTER
Was the patient seen in the last year in this department? No    Does patient have an active prescription for medications requested? No     Received Request Via: Pharmacy      Pt met protocol?: No, OV 5/18   BP Readings from Last 1 Encounters:   06/10/19 106/64

## 2019-10-23 NOTE — TELEPHONE ENCOUNTER
Pt was told 9/19 to make appt and that has not been done. Please advise pt only 30 days will be sent to pharmacy and next request will be denied.

## 2019-11-08 ENCOUNTER — OFFICE VISIT (OUTPATIENT)
Dept: MEDICAL GROUP | Facility: PHYSICIAN GROUP | Age: 69
End: 2019-11-08
Payer: COMMERCIAL

## 2019-11-08 ENCOUNTER — APPOINTMENT (OUTPATIENT)
Dept: RADIOLOGY | Facility: IMAGING CENTER | Age: 69
End: 2019-11-08
Attending: INTERNAL MEDICINE
Payer: COMMERCIAL

## 2019-11-08 VITALS
HEART RATE: 94 BPM | HEIGHT: 73 IN | OXYGEN SATURATION: 93 % | DIASTOLIC BLOOD PRESSURE: 78 MMHG | BODY MASS INDEX: 35.12 KG/M2 | TEMPERATURE: 98.4 F | SYSTOLIC BLOOD PRESSURE: 130 MMHG | WEIGHT: 265 LBS

## 2019-11-08 DIAGNOSIS — Z12.11 COLON CANCER SCREENING: ICD-10-CM

## 2019-11-08 DIAGNOSIS — Z11.59 NEED FOR HEPATITIS C SCREENING TEST: ICD-10-CM

## 2019-11-08 DIAGNOSIS — R05.9 COUGH: ICD-10-CM

## 2019-11-08 DIAGNOSIS — Z12.11 SCREENING FOR COLORECTAL CANCER: ICD-10-CM

## 2019-11-08 DIAGNOSIS — E66.9 OBESITY (BMI 30-39.9): ICD-10-CM

## 2019-11-08 DIAGNOSIS — F17.200 CURRENT SMOKER ON SOME DAYS: ICD-10-CM

## 2019-11-08 DIAGNOSIS — Z13.228 ENCOUNTER FOR SCREENING FOR METABOLIC DISORDER: ICD-10-CM

## 2019-11-08 DIAGNOSIS — M62.838 MUSCLE SPASM OF LEFT SHOULDER AREA: ICD-10-CM

## 2019-11-08 DIAGNOSIS — Z12.12 SCREENING FOR COLORECTAL CANCER: ICD-10-CM

## 2019-11-08 DIAGNOSIS — E66.9 CLASS 1 OBESITY WITH BODY MASS INDEX (BMI) OF 34.0 TO 34.9 IN ADULT, UNSPECIFIED OBESITY TYPE, UNSPECIFIED WHETHER SERIOUS COMORBIDITY PRESENT: ICD-10-CM

## 2019-11-08 DIAGNOSIS — I10 ESSENTIAL HYPERTENSION: ICD-10-CM

## 2019-11-08 DIAGNOSIS — Z71.6 ENCOUNTER FOR SMOKING CESSATION COUNSELING: ICD-10-CM

## 2019-11-08 DIAGNOSIS — M25.512 ACUTE PAIN OF LEFT SHOULDER: ICD-10-CM

## 2019-11-08 PROBLEM — J06.9 VIRAL URI WITH COUGH: Status: ACTIVE | Noted: 2019-11-08

## 2019-11-08 PROBLEM — M25.519 ACUTE SHOULDER PAIN: Status: ACTIVE | Noted: 2019-11-08

## 2019-11-08 PROBLEM — J40 BRONCHITIS: Status: ACTIVE | Noted: 2019-11-08

## 2019-11-08 PROCEDURE — 99214 OFFICE O/P EST MOD 30 MIN: CPT | Mod: 25 | Performed by: INTERNAL MEDICINE

## 2019-11-08 PROCEDURE — 71046 X-RAY EXAM CHEST 2 VIEWS: CPT | Mod: 26 | Performed by: INTERNAL MEDICINE

## 2019-11-08 PROCEDURE — 99406 BEHAV CHNG SMOKING 3-10 MIN: CPT | Mod: 25 | Performed by: INTERNAL MEDICINE

## 2019-11-08 PROCEDURE — 20552 NJX 1/MLT TRIGGER POINT 1/2: CPT | Performed by: INTERNAL MEDICINE

## 2019-11-08 RX ORDER — TRIAMCINOLONE ACETONIDE 40 MG/ML
40 INJECTION, SUSPENSION INTRA-ARTICULAR; INTRAMUSCULAR ONCE
Status: COMPLETED | OUTPATIENT
Start: 2019-11-08 | End: 2019-11-08

## 2019-11-08 RX ORDER — LISINOPRIL AND HYDROCHLOROTHIAZIDE 20; 12.5 MG/1; MG/1
TABLET ORAL
Qty: 60 TAB | Refills: 0 | Status: SHIPPED | OUTPATIENT
Start: 2019-11-08 | End: 2020-01-02 | Stop reason: SDUPTHER

## 2019-11-08 RX ORDER — LIDOCAINE HYDROCHLORIDE 20 MG/ML
4 INJECTION, SOLUTION EPIDURAL; INFILTRATION; INTRACAUDAL; PERINEURAL ONCE
Status: COMPLETED | OUTPATIENT
Start: 2019-11-08 | End: 2019-11-08

## 2019-11-08 RX ADMIN — TRIAMCINOLONE ACETONIDE 40 MG: 40 INJECTION, SUSPENSION INTRA-ARTICULAR; INTRAMUSCULAR at 16:04

## 2019-11-08 RX ADMIN — LIDOCAINE HYDROCHLORIDE 4 ML: 20 INJECTION, SOLUTION EPIDURAL; INFILTRATION; INTRACAUDAL; PERINEURAL at 16:03

## 2019-11-08 ASSESSMENT — PATIENT HEALTH QUESTIONNAIRE - PHQ9: CLINICAL INTERPRETATION OF PHQ2 SCORE: 0

## 2019-11-08 NOTE — PROCEDURES
Procedure:  After appropriate verbal and written consent  Palpation of the trigger point  elicited a “jump sign” and local twitch response.    A 22-gauge, 3.8-cm (1.5 in.) needle was used to inject 2cc of a mix containing 2%lidocaine plus kenalog 40mg into the Left Interscapular muscle superficially, the muscle was then stretched      Plan    2cc of Lidocaine plus Kenalog 40mg was injected into Left Interscapular muscle. There were no adverse events, the patient  was given post procedure precuations.

## 2019-11-08 NOTE — ASSESSMENT & PLAN NOTE
This is a new problem on the left shoulder blade since last 4-5 days . Denies any lifting weights and has worsening while coughing. Pt says that its 10/10 in severity in the nights.

## 2019-11-08 NOTE — LETTER
November 11, 2019        Oliverio Velazquez  [address]  [place] [address]        Dear Oliverio:    Your X ray was normal.     If you have any questions or concerns, please don't hesitate to call.        Sincerely,        [facility] [address]    Electronically Signed

## 2019-11-08 NOTE — PATIENT INSTRUCTIONS
AFter Care instructions of Trigger point -     A potential complication from the trigger point injection procedure is post-injection pain. This is relatively uncommon, but it can occur. This pain usually resolves by itself after a few days.   - Ice, heat, or over-the-counter medications such as acetaminophen, ibuprofen, or naproxen sodium may be useful for post-injection pain.  If a steroid medication is injected into the trigger point, there is a risk of shrinkage of the fat under the skin, leaving a dent in the skin. This does not occur when only anesthetic is injected without any steroid medication.   - Other side effects are rare with trigger point injections but can occur anytime a needle punctures the skin, including infection and bleeding.    Call your healthcare provider right away if any of these occur:  · Increasing redness or swelling  · Red streaks in the skin leading away from the wound  · Increasing local pain or swelling  · Continued pus draining from the wound 2 days after treatment  · Fever of 100.4ºF (38ºC) or higher, or as directed by your healthcare provide

## 2019-11-08 NOTE — ASSESSMENT & PLAN NOTE
There is a new problem which patient is facing for the last many years but never expressed this as he has never been following any primary care.  I am seeing him after more than 1-1/2-year.  He has a symptom only when he is lying down flat during the nights.

## 2019-11-08 NOTE — ASSESSMENT & PLAN NOTE
This is a chronic health problem that is uncontrolled with current medications and lifestyle measures. Pt states that he smokes pipe / Cigars once in 3 - 4 weeks.

## 2019-12-26 DIAGNOSIS — I10 ESSENTIAL HYPERTENSION: ICD-10-CM

## 2019-12-30 RX ORDER — LISINOPRIL AND HYDROCHLOROTHIAZIDE 20; 12.5 MG/1; MG/1
TABLET ORAL
Qty: 60 TAB | Refills: 0 | OUTPATIENT
Start: 2019-12-30

## 2019-12-30 NOTE — TELEPHONE ENCOUNTER
Was the patient seen in the last year in this department? Yes    Does patient have an active prescription for medications requested? No     Received Request Via: Pharmacy      Pt met protocol?: Yes, OV 11/19   BP Readings from Last 1 Encounters:   11/08/19 130/78

## 2020-01-02 DIAGNOSIS — I10 ESSENTIAL HYPERTENSION: ICD-10-CM

## 2020-01-02 RX ORDER — LISINOPRIL AND HYDROCHLOROTHIAZIDE 20; 12.5 MG/1; MG/1
TABLET ORAL
Qty: 60 TAB | Refills: 0 | Status: SHIPPED | OUTPATIENT
Start: 2020-01-02 | End: 2020-01-29

## 2020-01-02 NOTE — TELEPHONE ENCOUNTER
Patient is upset that his refills for his blood pressure medications are continuously getting denied.   Please advise

## 2020-01-29 ENCOUNTER — TELEPHONE (OUTPATIENT)
Dept: MEDICAL GROUP | Facility: PHYSICIAN GROUP | Age: 70
End: 2020-01-29

## 2020-01-29 DIAGNOSIS — I10 ESSENTIAL HYPERTENSION: ICD-10-CM

## 2020-01-29 RX ORDER — LISINOPRIL AND HYDROCHLOROTHIAZIDE 20; 12.5 MG/1; MG/1
TABLET ORAL
Qty: 180 TAB | Refills: 0 | Status: SHIPPED | OUTPATIENT
Start: 2020-01-29 | End: 2020-04-27

## 2020-01-29 NOTE — TELEPHONE ENCOUNTER
Was the patient seen in the last year in this department? Yes    Does patient have an active prescription for medications requested? No     Received Request Via: Pharmacy    Pt met protocol?: Yes     Last OV 11/08/19    BP Readings from Last 1 Encounters:   11/08/19 130/78

## 2020-04-25 DIAGNOSIS — I10 ESSENTIAL HYPERTENSION: ICD-10-CM

## 2020-04-27 RX ORDER — LISINOPRIL AND HYDROCHLOROTHIAZIDE 20; 12.5 MG/1; MG/1
TABLET ORAL
Qty: 180 TAB | Refills: 1 | Status: SHIPPED | OUTPATIENT
Start: 2020-04-27 | End: 2020-07-13 | Stop reason: SDUPTHER

## 2020-04-27 NOTE — TELEPHONE ENCOUNTER
Refill X 6 months, sent to pharmacy.Pt. Seen in the last 6 months per protocol.   No results found for: SODIUM, POTASSIUM, CHLORIDE, CO2, GLUCOSE, BUN, CREATININE, BUNCREATRAT, GLOMRATE

## 2020-07-13 ENCOUNTER — TELEPHONE (OUTPATIENT)
Dept: MEDICAL GROUP | Facility: PHYSICIAN GROUP | Age: 70
End: 2020-07-13

## 2020-07-13 ENCOUNTER — OFFICE VISIT (OUTPATIENT)
Dept: MEDICAL GROUP | Facility: PHYSICIAN GROUP | Age: 70
End: 2020-07-13
Payer: COMMERCIAL

## 2020-07-13 VITALS
HEIGHT: 73 IN | DIASTOLIC BLOOD PRESSURE: 74 MMHG | WEIGHT: 260 LBS | OXYGEN SATURATION: 96 % | BODY MASS INDEX: 34.46 KG/M2 | SYSTOLIC BLOOD PRESSURE: 124 MMHG | HEART RATE: 86 BPM | TEMPERATURE: 99.2 F

## 2020-07-13 DIAGNOSIS — Z01.84 ANTIBODY RESPONSE EXAMINATION: ICD-10-CM

## 2020-07-13 DIAGNOSIS — Z00.00 ENCOUNTER FOR PREVENTIVE CARE: ICD-10-CM

## 2020-07-13 DIAGNOSIS — L40.9 PSORIASIS: ICD-10-CM

## 2020-07-13 DIAGNOSIS — E55.9 VITAMIN D DEFICIENCY: ICD-10-CM

## 2020-07-13 DIAGNOSIS — Z87.09 HISTORY OF RESPIRATORY TRACT INFECTION: ICD-10-CM

## 2020-07-13 DIAGNOSIS — I10 ESSENTIAL HYPERTENSION: ICD-10-CM

## 2020-07-13 DIAGNOSIS — R19.5 POSITIVE FECAL OCCULT BLOOD TEST: ICD-10-CM

## 2020-07-13 DIAGNOSIS — R43.0 LOSS OF SMELL: ICD-10-CM

## 2020-07-13 DIAGNOSIS — Z11.59 NEED FOR HEPATITIS C SCREENING TEST: ICD-10-CM

## 2020-07-13 PROBLEM — Z96.659 HX OF TOTAL KNEE ARTHROPLASTY: Status: RESOLVED | Noted: 2018-03-06 | Resolved: 2020-07-13

## 2020-07-13 PROBLEM — M25.519 ACUTE SHOULDER PAIN: Status: RESOLVED | Noted: 2019-11-08 | Resolved: 2020-07-13

## 2020-07-13 PROBLEM — M25.552 HIP PAIN, ACUTE, LEFT: Status: RESOLVED | Noted: 2018-03-06 | Resolved: 2020-07-13

## 2020-07-13 PROBLEM — Z12.11 COLON CANCER SCREENING: Status: RESOLVED | Noted: 2018-03-06 | Resolved: 2020-07-13

## 2020-07-13 PROBLEM — R05.9 COUGH: Status: RESOLVED | Noted: 2019-11-08 | Resolved: 2020-07-13

## 2020-07-13 PROCEDURE — 99214 OFFICE O/P EST MOD 30 MIN: CPT | Performed by: FAMILY MEDICINE

## 2020-07-13 RX ORDER — LISINOPRIL AND HYDROCHLOROTHIAZIDE 20; 12.5 MG/1; MG/1
2 TABLET ORAL 2 TIMES DAILY
Qty: 180 TAB | Refills: 1 | Status: SHIPPED | OUTPATIENT
Start: 2020-07-13 | End: 2021-06-30

## 2020-07-13 RX ORDER — TRIAMCINOLONE ACETONIDE 1 MG/G
OINTMENT TOPICAL
Qty: 1 TUBE | Refills: 3 | Status: SHIPPED | OUTPATIENT
Start: 2020-07-13 | End: 2022-08-16

## 2020-07-13 ASSESSMENT — PATIENT HEALTH QUESTIONNAIRE - PHQ9: CLINICAL INTERPRETATION OF PHQ2 SCORE: 0

## 2020-07-13 NOTE — TELEPHONE ENCOUNTER
[facility] called wanting to verify dose of patient's lisinopril.  He was taking 2 tablets qd, the rx today says 2 tabs bid. Please verify and I will let them know. Thank you.

## 2020-07-13 NOTE — PROGRESS NOTES
CC: Hypertension    HISTORY OF THE PRESENT ILLNESS: Patient is a 70 y.o. male. This pleasant patient is here today to establish care discuss health problems below.    Patient is here today with primary concern for getting COVID-19 antibody testing.  He reports that he had a very severe respiratory infection at the end of February into early March.  At that time he describes symptoms of cough, shortness of breath, loss of taste and smell, severe fatigue.  He reports that since that time he has intermittently had some dyspnea and occasional loss of sense of smell and taste which does not last long.  Previous to that he had not had any known travel or sick contacts.    Also requesting refills on his blood pressure medication today.  Blood pressure is well controlled on lisinopril-hydrochlorothiazide which he takes twice daily.  Denies side effects.  Due for lab work.     Patient has rash on his hands which consist of white, raised plaques on his knuckles and patchy on the rest of his hands.  States that is gotten worse recently since using a lot of hand gel.  Occasionally gets itchy and painful.    In reviewing patient's chart, he does have a history of a positive fit test in 2018.  He was referred for colonoscopy at that time, but did not complete it.  He reports that he had a negative fit test subsequently, but this is not in his chart.  States he is not open to regular colonoscopy only virtual colonoscopies.  Would be open to repeat fit test.    Allergies: Patient has no known allergies.    Current Outpatient Medications Ordered in Epic   Medication Sig Dispense Refill   • triamcinolone acetonide (KENALOG) 0.1 % Ointment Apply affected area twice daily. Do not use for more than 2 weeks at time. 1 Tube 3   • lisinopril-hydrochlorothiazide (PRINZIDE) 20-12.5 MG per tablet Take 2 Tabs by mouth 2 times a day. 180 Tab 1     No current Epic-ordered facility-administered medications on file.        Past Medical History:  "  Diagnosis Date   • Chronic midline low back pain with sciatica 3/6/2018   • Essential hypertension 3/6/2018   • Hx of total knee arthroplasty 03/06/2018   • Hypertension    • Rotator cuff arthropathy, right 2009    Metal in place.   • S/P total knee arthroplasty Rt side , 2002    He was told that the life expectancy of the arthroplasty is 15-20 yrs       History reviewed. No pertinent surgical history.    Social History     Tobacco Use   • Smoking status: Light Tobacco Smoker     Packs/day: 0.00     Types: Cigars   • Smokeless tobacco: Never Used   • Tobacco comment: 4 cigars/year   Substance Use Topics   • Alcohol use: Yes     Comment: Cocktails of 2 -3 drinks around 5-6x/week   • Drug use: No       Social History     Social History Narrative   • Not on file       Family History   Problem Relation Age of Onset   • No Known Problems Sister    • No Known Problems Maternal Grandmother    • No Known Problems Maternal Grandfather    • No Known Problems Paternal Grandmother    • No Known Problems Paternal Grandfather        ROS:     - Constitutional: Negative for fever, chills, unexpected weight change, and fatigue/generalized weakness.     - Respiratory: Negative for cough, sputum production, chest congestion, dyspnea, wheezing, and crackles.      - Cardiovascular: Negative for chest pain, palpitations, orthopnea, PND, and bilateral lower extremity edema.     - Gastrointestinal: Negative for heartburn, nausea, vomiting, abdominal pain, hematochezia, melena, diarrhea, constipation, and greasy/foul-smelling stools.     Exam: /74 (BP Location: Left arm, Patient Position: Sitting, BP Cuff Size: Large adult)   Pulse 86   Temp 37.3 °C (99.2 °F) (Temporal)   Ht 1.854 m (6' 1\")   Wt 117.9 kg (260 lb)   SpO2 96%  Body mass index is 34.3 kg/m².    General: Well appearing, NAD  Pulmonary: Clear to ausculation.  Normal effort. No rales, ronchi, or wheezing.  Cardiovascular: Regular rate and rhythm without murmur, rubs " or gallop.   Skin: Warm and dry.  Raised, white plaques noted on hands, mostly on knuckles.  Musculoskeletal:  No extremity cyanosis, clubbing, or edema.  Psych: Normal mood and affect. Alert and oriented. Judgment and insight is normal.    Please note that this dictation was created using voice recognition software. I have made every reasonable attempt to correct obvious errors, but I expect that there are errors of grammar and possibly content that I did not discover before finalizing the note.      Assessment/Plan  Oliverio was seen today for establish care, hypertension follow-up, requesting labs and medication refill.    Diagnoses and all orders for this visit:    Essential hypertension  Chronic, well controlled.  Lisinopril hydrochlorothiazide refilled today.  Due for lab work.  -     Comp Metabolic Panel; Future  -     lisinopril-hydrochlorothiazide (PRINZIDE) 20-12.5 MG per tablet; Take 2 Tabs by mouth 2 times a day.    Positive fecal occult blood test  Patient with history of positive fit test.  Not agreeable to routine colonoscopy.  I discussed with patient that we should go ahead and repeat the fit test.  If positive, may need to look into getting virtual colonoscopy as patient is not willing to do it any other way.  -     OCCULT BLOOD FECES IMMUNOASSAY (FIT); Future    Need for hepatitis C screening test  -     HCV Scrn ( [id number] 1xLife); Future    Vitamin D deficiency  -     VITAMIN D,25 HYDROXY; Future    Loss of smell  Antibody response examination  History of respiratory tract infection  Patient with history of respiratory infection late February/early March, symptoms possibly consistent with COVID-19.  Antibody testing ordered.  Limitations of antibody testing discussed with patient including unknown amount of time which antibodies lasts, and unknown whether or not positive antibody test confers immunity.  -     SARS CoV-2 Ab, Total; Future    Psoriasis  Rash on hands consistent with plaque  psoriasis.  We will go ahead and trial Kenalog ointment to start with.  -     triamcinolone acetonide (KENALOG) 0.1 % Ointment; Apply affected area twice daily. Do not use for more than 2 weeks at time.    Encounter for preventive care  -     CBC WITH DIFFERENTIAL; Future  -     Comp Metabolic Panel; Future  -     VITAMIN D,25 HYDROXY; Future  -     Lipid Profile; Future      Follow-up in 6 months or sooner if needed.    La Otero DO  [facility]

## 2020-07-15 ENCOUNTER — HOSPITAL ENCOUNTER (OUTPATIENT)
Dept: LAB | Facility: MEDICAL CENTER | Age: 70
End: 2020-07-15
Attending: FAMILY MEDICINE
Payer: COMMERCIAL

## 2020-07-15 DIAGNOSIS — I10 ESSENTIAL HYPERTENSION: ICD-10-CM

## 2020-07-15 DIAGNOSIS — R43.0 LOSS OF SMELL: ICD-10-CM

## 2020-07-15 DIAGNOSIS — E55.9 VITAMIN D DEFICIENCY: ICD-10-CM

## 2020-07-15 DIAGNOSIS — Z11.59 NEED FOR HEPATITIS C SCREENING TEST: ICD-10-CM

## 2020-07-15 DIAGNOSIS — Z87.09 HISTORY OF RESPIRATORY TRACT INFECTION: ICD-10-CM

## 2020-07-15 DIAGNOSIS — Z00.00 ENCOUNTER FOR PREVENTIVE CARE: ICD-10-CM

## 2020-07-15 DIAGNOSIS — Z01.84 ANTIBODY RESPONSE EXAMINATION: ICD-10-CM

## 2020-07-15 LAB
25(OH)D3 SERPL-MCNC: 35 NG/ML (ref 30–100)
ALBUMIN SERPL BCP-MCNC: 4.3 G/DL (ref 3.2–4.9)
ALBUMIN/GLOB SERPL: 1.5 G/DL
ALP SERPL-CCNC: 48 U/L (ref 30–99)
ALT SERPL-CCNC: 46 U/L (ref 2–50)
ANION GAP SERPL CALC-SCNC: 11 MMOL/L (ref 7–16)
AST SERPL-CCNC: 29 U/L (ref 12–45)
BASOPHILS # BLD AUTO: 1.6 % (ref 0–1.8)
BASOPHILS # BLD: 0.1 K/UL (ref 0–0.12)
BILIRUB SERPL-MCNC: 0.7 MG/DL (ref 0.1–1.5)
BUN SERPL-MCNC: 23 MG/DL (ref 8–22)
CALCIUM SERPL-MCNC: 9.6 MG/DL (ref 8.5–10.5)
CHLORIDE SERPL-SCNC: 105 MMOL/L (ref 96–112)
CHOLEST SERPL-MCNC: 187 MG/DL (ref 100–199)
CO2 SERPL-SCNC: 25 MMOL/L (ref 20–33)
CREAT SERPL-MCNC: 1.17 MG/DL (ref 0.5–1.4)
EOSINOPHIL # BLD AUTO: 0.36 K/UL (ref 0–0.51)
EOSINOPHIL NFR BLD: 5.8 % (ref 0–6.9)
ERYTHROCYTE [DISTWIDTH] IN BLOOD BY AUTOMATED COUNT: 45.4 FL (ref 35.9–50)
FASTING STATUS PATIENT QL REPORTED: NORMAL
GLOBULIN SER CALC-MCNC: 2.8 G/DL (ref 1.9–3.5)
GLUCOSE SERPL-MCNC: 106 MG/DL (ref 65–99)
HCT VFR BLD AUTO: 53.3 % (ref 42–52)
HCV AB SER QL: NORMAL
HDLC SERPL-MCNC: 41 MG/DL
HGB BLD-MCNC: 18.2 G/DL (ref 14–18)
IMM GRANULOCYTES # BLD AUTO: 0.02 K/UL (ref 0–0.11)
IMM GRANULOCYTES NFR BLD AUTO: 0.3 % (ref 0–0.9)
LDLC SERPL CALC-MCNC: 119 MG/DL
LYMPHOCYTES # BLD AUTO: 2.61 K/UL (ref 1–4.8)
LYMPHOCYTES NFR BLD: 41.7 % (ref 22–41)
MCH RBC QN AUTO: 33 PG (ref 27–33)
MCHC RBC AUTO-ENTMCNC: 34.1 G/DL (ref 33.7–35.3)
MCV RBC AUTO: 96.6 FL (ref 81.4–97.8)
MONOCYTES # BLD AUTO: 0.55 K/UL (ref 0–0.85)
MONOCYTES NFR BLD AUTO: 8.8 % (ref 0–13.4)
NEUTROPHILS # BLD AUTO: 2.62 K/UL (ref 1.82–7.42)
NEUTROPHILS NFR BLD: 41.8 % (ref 44–72)
NRBC # BLD AUTO: 0 K/UL
NRBC BLD-RTO: 0 /100 WBC
PLATELET # BLD AUTO: 279 K/UL (ref 164–446)
PMV BLD AUTO: 9.9 FL (ref 9–12.9)
POTASSIUM SERPL-SCNC: 4 MMOL/L (ref 3.6–5.5)
PROT SERPL-MCNC: 7.1 G/DL (ref 6–8.2)
RBC # BLD AUTO: 5.52 M/UL (ref 4.7–6.1)
SARS-COV-2 AB SERPL QL IA: NORMAL
SODIUM SERPL-SCNC: 141 MMOL/L (ref 135–145)
TRIGL SERPL-MCNC: 137 MG/DL (ref 0–149)
WBC # BLD AUTO: 6.3 K/UL (ref 4.8–10.8)

## 2020-07-15 PROCEDURE — 80061 LIPID PANEL: CPT

## 2020-07-15 PROCEDURE — 36415 COLL VENOUS BLD VENIPUNCTURE: CPT

## 2020-07-15 PROCEDURE — 80053 COMPREHEN METABOLIC PANEL: CPT

## 2020-07-15 PROCEDURE — G0472 HEP C SCREEN HIGH RISK/OTHER: HCPCS

## 2020-07-15 PROCEDURE — 86769 SARS-COV-2 COVID-19 ANTIBODY: CPT

## 2020-07-15 PROCEDURE — 85025 COMPLETE CBC W/AUTO DIFF WBC: CPT

## 2020-07-15 PROCEDURE — 82306 VITAMIN D 25 HYDROXY: CPT

## 2020-07-16 ENCOUNTER — TELEPHONE (OUTPATIENT)
Dept: MEDICAL GROUP | Facility: PHYSICIAN GROUP | Age: 70
End: 2020-07-16

## 2020-07-16 PROBLEM — E78.00 ELEVATED LDL CHOLESTEROL LEVEL: Status: ACTIVE | Noted: 2020-07-16

## 2020-07-16 PROBLEM — R73.01 ELEVATED FASTING BLOOD SUGAR: Status: ACTIVE | Noted: 2020-07-16

## 2020-07-16 NOTE — TELEPHONE ENCOUNTER
----- Message from La Otero D.O. sent at 2020  1:40 PM PDT -----  Please call patient let him know all of his lab work was normal with the exception of the followin.  LDL cholesterol was mildly elevated.  This is the kind of cholesterol that can clog the arteries over time.  I do not recommend medication, but recommend working on decreasing intake of saturated fats such as red meat, cheese, eggs, butters.  Exercise can improve these numbers as well.    2.  Fasting sugar was also mildly elevated, but not in the diabetes range.  Again healthy with cutting back on starchy and sugary foods is helpful for keeping sugars in the appropriate range in addition to exercise.

## 2021-01-15 DIAGNOSIS — Z23 NEED FOR VACCINATION: ICD-10-CM

## 2021-02-17 ENCOUNTER — IMMUNIZATION (OUTPATIENT)
Dept: FAMILY PLANNING/WOMEN'S HEALTH CLINIC | Facility: IMMUNIZATION CENTER | Age: 71
End: 2021-02-17
Attending: INTERNAL MEDICINE
Payer: COMMERCIAL

## 2021-02-17 DIAGNOSIS — Z23 ENCOUNTER FOR VACCINATION: Primary | ICD-10-CM

## 2021-02-17 DIAGNOSIS — Z23 NEED FOR VACCINATION: ICD-10-CM

## 2021-02-17 PROCEDURE — 91300 PFIZER SARS-COV-2 VACCINE: CPT

## 2021-02-17 PROCEDURE — 0001A PFIZER SARS-COV-2 VACCINE: CPT

## 2021-02-18 ENCOUNTER — NURSE TRIAGE (OUTPATIENT)
Dept: HEALTH INFORMATION MANAGEMENT | Facility: OTHER | Age: 71
End: 2021-02-18

## 2021-03-10 ENCOUNTER — IMMUNIZATION (OUTPATIENT)
Dept: FAMILY PLANNING/WOMEN'S HEALTH CLINIC | Facility: IMMUNIZATION CENTER | Age: 71
End: 2021-03-10
Attending: INTERNAL MEDICINE
Payer: COMMERCIAL

## 2021-03-10 DIAGNOSIS — Z23 ENCOUNTER FOR VACCINATION: Primary | ICD-10-CM

## 2021-03-10 PROCEDURE — 91300 PFIZER SARS-COV-2 VACCINE: CPT

## 2021-03-10 PROCEDURE — 0002A PFIZER SARS-COV-2 VACCINE: CPT

## 2021-06-27 DIAGNOSIS — I10 ESSENTIAL HYPERTENSION: ICD-10-CM

## 2021-06-30 DIAGNOSIS — I10 ESSENTIAL HYPERTENSION: ICD-10-CM

## 2021-06-30 RX ORDER — LISINOPRIL AND HYDROCHLOROTHIAZIDE 20; 12.5 MG/1; MG/1
2 TABLET ORAL 2 TIMES DAILY
Qty: 180 TABLET | Refills: 0 | Status: SHIPPED | OUTPATIENT
Start: 2021-06-30 | End: 2021-10-08

## 2021-07-02 RX ORDER — LISINOPRIL AND HYDROCHLOROTHIAZIDE 20; 12.5 MG/1; MG/1
2 TABLET ORAL 2 TIMES DAILY
Qty: 180 TABLET | Refills: 1 | OUTPATIENT
Start: 2021-07-02

## 2022-07-09 SDOH — ECONOMIC STABILITY: HOUSING INSECURITY
IN THE LAST 12 MONTHS, WAS THERE A TIME WHEN YOU DID NOT HAVE A STEADY PLACE TO SLEEP OR SLEPT IN A SHELTER (INCLUDING NOW)?: NO

## 2022-07-09 SDOH — ECONOMIC STABILITY: FOOD INSECURITY: WITHIN THE PAST 12 MONTHS, THE FOOD YOU BOUGHT JUST DIDN'T LAST AND YOU DIDN'T HAVE MONEY TO GET MORE.: NEVER TRUE

## 2022-07-09 SDOH — ECONOMIC STABILITY: HOUSING INSECURITY: IN THE LAST 12 MONTHS, HOW MANY PLACES HAVE YOU LIVED?: 1

## 2022-07-09 SDOH — ECONOMIC STABILITY: INCOME INSECURITY: IN THE LAST 12 MONTHS, WAS THERE A TIME WHEN YOU WERE NOT ABLE TO PAY THE MORTGAGE OR RENT ON TIME?: NO

## 2022-07-09 SDOH — ECONOMIC STABILITY: TRANSPORTATION INSECURITY
IN THE PAST 12 MONTHS, HAS THE LACK OF TRANSPORTATION KEPT YOU FROM MEDICAL APPOINTMENTS OR FROM GETTING MEDICATIONS?: NO

## 2022-07-09 SDOH — HEALTH STABILITY: MENTAL HEALTH
STRESS IS WHEN SOMEONE FEELS TENSE, NERVOUS, ANXIOUS, OR CAN'T SLEEP AT NIGHT BECAUSE THEIR MIND IS TROUBLED. HOW STRESSED ARE YOU?: NOT AT ALL

## 2022-07-09 SDOH — HEALTH STABILITY: PHYSICAL HEALTH: ON AVERAGE, HOW MANY MINUTES DO YOU ENGAGE IN EXERCISE AT THIS LEVEL?: 10 MIN

## 2022-07-09 SDOH — ECONOMIC STABILITY: FOOD INSECURITY: WITHIN THE PAST 12 MONTHS, YOU WORRIED THAT YOUR FOOD WOULD RUN OUT BEFORE YOU GOT MONEY TO BUY MORE.: NEVER TRUE

## 2022-07-09 SDOH — HEALTH STABILITY: PHYSICAL HEALTH: ON AVERAGE, HOW MANY DAYS PER WEEK DO YOU ENGAGE IN MODERATE TO STRENUOUS EXERCISE (LIKE A BRISK WALK)?: 7 DAYS

## 2022-07-09 SDOH — ECONOMIC STABILITY: TRANSPORTATION INSECURITY
IN THE PAST 12 MONTHS, HAS LACK OF RELIABLE TRANSPORTATION KEPT YOU FROM MEDICAL APPOINTMENTS, MEETINGS, WORK OR FROM GETTING THINGS NEEDED FOR DAILY LIVING?: NO

## 2022-07-09 SDOH — ECONOMIC STABILITY: INCOME INSECURITY: HOW HARD IS IT FOR YOU TO PAY FOR THE VERY BASICS LIKE FOOD, HOUSING, MEDICAL CARE, AND HEATING?: NOT HARD AT ALL

## 2022-07-09 SDOH — ECONOMIC STABILITY: TRANSPORTATION INSECURITY
IN THE PAST 12 MONTHS, HAS LACK OF TRANSPORTATION KEPT YOU FROM MEETINGS, WORK, OR FROM GETTING THINGS NEEDED FOR DAILY LIVING?: NO

## 2022-07-09 ASSESSMENT — LIFESTYLE VARIABLES
HOW OFTEN DO YOU HAVE SIX OR MORE DRINKS ON ONE OCCASION: NEVER
SKIP TO QUESTIONS 9-10: 1
HOW OFTEN DO YOU HAVE A DRINK CONTAINING ALCOHOL: 2-3 TIMES A WEEK
HOW MANY STANDARD DRINKS CONTAINING ALCOHOL DO YOU HAVE ON A TYPICAL DAY: 1 OR 2
AUDIT-C TOTAL SCORE: 3

## 2022-07-09 ASSESSMENT — SOCIAL DETERMINANTS OF HEALTH (SDOH)
HOW HARD IS IT FOR YOU TO PAY FOR THE VERY BASICS LIKE FOOD, HOUSING, MEDICAL CARE, AND HEATING?: NOT HARD AT ALL
HOW OFTEN DO YOU ATTENT MEETINGS OF THE CLUB OR ORGANIZATION YOU BELONG TO?: MORE THAN 4 TIMES PER YEAR
HOW OFTEN DO YOU ATTEND CHURCH OR RELIGIOUS SERVICES?: NEVER
HOW MANY DRINKS CONTAINING ALCOHOL DO YOU HAVE ON A TYPICAL DAY WHEN YOU ARE DRINKING: 1 OR 2
HOW OFTEN DO YOU ATTENT MEETINGS OF THE CLUB OR ORGANIZATION YOU BELONG TO?: MORE THAN 4 TIMES PER YEAR
IN A TYPICAL WEEK, HOW MANY TIMES DO YOU TALK ON THE PHONE WITH FAMILY, FRIENDS, OR NEIGHBORS?: TWICE A WEEK
DO YOU BELONG TO ANY CLUBS OR ORGANIZATIONS SUCH AS CHURCH GROUPS UNIONS, FRATERNAL OR ATHLETIC GROUPS, OR SCHOOL GROUPS?: YES
WITHIN THE PAST 12 MONTHS, YOU WORRIED THAT YOUR FOOD WOULD RUN OUT BEFORE YOU GOT THE MONEY TO BUY MORE: NEVER TRUE
HOW OFTEN DO YOU HAVE A DRINK CONTAINING ALCOHOL: 2-3 TIMES A WEEK
HOW OFTEN DO YOU HAVE SIX OR MORE DRINKS ON ONE OCCASION: NEVER
HOW OFTEN DO YOU GET TOGETHER WITH FRIENDS OR RELATIVES?: TWICE A WEEK
HOW OFTEN DO YOU ATTEND CHURCH OR RELIGIOUS SERVICES?: NEVER
IN A TYPICAL WEEK, HOW MANY TIMES DO YOU TALK ON THE PHONE WITH FAMILY, FRIENDS, OR NEIGHBORS?: TWICE A WEEK
HOW OFTEN DO YOU GET TOGETHER WITH FRIENDS OR RELATIVES?: TWICE A WEEK
DO YOU BELONG TO ANY CLUBS OR ORGANIZATIONS SUCH AS CHURCH GROUPS UNIONS, FRATERNAL OR ATHLETIC GROUPS, OR SCHOOL GROUPS?: YES

## 2022-07-12 ENCOUNTER — OFFICE VISIT (OUTPATIENT)
Dept: MEDICAL GROUP | Facility: PHYSICIAN GROUP | Age: 72
End: 2022-07-12
Payer: COMMERCIAL

## 2022-07-12 VITALS
DIASTOLIC BLOOD PRESSURE: 78 MMHG | BODY MASS INDEX: 31.02 KG/M2 | WEIGHT: 229 LBS | TEMPERATURE: 98.5 F | OXYGEN SATURATION: 95 % | HEIGHT: 72 IN | HEART RATE: 97 BPM | RESPIRATION RATE: 14 BRPM | SYSTOLIC BLOOD PRESSURE: 118 MMHG

## 2022-07-12 DIAGNOSIS — E78.5 DYSLIPIDEMIA: ICD-10-CM

## 2022-07-12 DIAGNOSIS — Z78.9 ALCOHOL USE: ICD-10-CM

## 2022-07-12 DIAGNOSIS — E55.9 VITAMIN D DEFICIENCY: ICD-10-CM

## 2022-07-12 DIAGNOSIS — F17.200 CURRENT SMOKER ON SOME DAYS: ICD-10-CM

## 2022-07-12 DIAGNOSIS — R42 VERTIGO: ICD-10-CM

## 2022-07-12 DIAGNOSIS — R71.8 ELEVATED HEMATOCRIT: ICD-10-CM

## 2022-07-12 DIAGNOSIS — E66.9 OBESITY (BMI 30-39.9): ICD-10-CM

## 2022-07-12 DIAGNOSIS — Z13.29 SCREENING FOR THYROID DISORDER: ICD-10-CM

## 2022-07-12 DIAGNOSIS — R73.01 ELEVATED FASTING BLOOD SUGAR: ICD-10-CM

## 2022-07-12 DIAGNOSIS — I10 ESSENTIAL HYPERTENSION: ICD-10-CM

## 2022-07-12 DIAGNOSIS — Z13.21 ENCOUNTER FOR VITAMIN DEFICIENCY SCREENING: ICD-10-CM

## 2022-07-12 DIAGNOSIS — R19.5 POSITIVE FIT (FECAL IMMUNOCHEMICAL TEST): ICD-10-CM

## 2022-07-12 PROBLEM — E78.00 ELEVATED LDL CHOLESTEROL LEVEL: Status: RESOLVED | Noted: 2020-07-16 | Resolved: 2022-07-12

## 2022-07-12 PROBLEM — F10.90 ALCOHOL USE: Status: ACTIVE | Noted: 2022-07-12

## 2022-07-12 PROCEDURE — 99214 OFFICE O/P EST MOD 30 MIN: CPT | Performed by: INTERNAL MEDICINE

## 2022-07-12 RX ORDER — LISINOPRIL AND HYDROCHLOROTHIAZIDE 20; 12.5 MG/1; MG/1
1 TABLET ORAL 2 TIMES DAILY
Qty: 180 TABLET | Refills: 1 | Status: SHIPPED | OUTPATIENT
Start: 2022-07-12 | End: 2023-05-03

## 2022-07-12 ASSESSMENT — PATIENT HEALTH QUESTIONNAIRE - PHQ9: CLINICAL INTERPRETATION OF PHQ2 SCORE: 0

## 2022-07-12 ASSESSMENT — FIBROSIS 4 INDEX: FIB4 SCORE: 1.12

## 2022-07-12 NOTE — PROGRESS NOTES
New Patient to establish    Chief Complaint   Patient presents with   • Establish Care   • Medication Refill   • Vertigo       Subjective:     History of Present Illness: Patient is a 72 y.o. male who is here today to establish primary care    Current Outpatient Medications on File Prior to Visit   Medication Sig Dispense Refill   • triamcinolone acetonide (KENALOG) 0.1 % Ointment Apply affected area twice daily. Do not use for more than 2 weeks at time. 1 Tube 3     No current facility-administered medications on file prior to visit.     No Known Allergies  Patient Active Problem List    Diagnosis Date Noted   • Dyslipidemia 07/12/2022   • Vitamin D deficiency 07/12/2022   • Positive FIT (fecal immunochemical test) 07/12/2022   • Elevated fasting blood sugar 07/16/2020   • Current smoker on some days 11/08/2019   • Seasonal allergic rhinitis due to pollen 05/25/2018   • Chronic midline low back pain with sciatica 03/06/2018   • Essential hypertension 03/06/2018   • Obesity (BMI 35.0-39.9 without comorbidity) ([facility]) 03/06/2018   • History of total right knee replacement 03/06/2018   • Post-traumatic osteoarthritis of both knees 03/06/2018     Past Medical History:   Diagnosis Date   • Chronic midline low back pain with sciatica 3/6/2018   • Essential hypertension 3/6/2018   • Hx of total knee arthroplasty 03/06/2018   • Hypertension    • Rotator cuff arthropathy, right 2009    Metal in place.   • S/P total knee arthroplasty Rt side , 2002    He was told that the life expectancy of the arthroplasty is 15-20 yrs     No past surgical history on file.  Family History   Problem Relation Age of Onset   • No Known Problems Sister    • No Known Problems Maternal Grandmother    • No Known Problems Maternal Grandfather    • No Known Problems Paternal Grandmother    • No Known Problems Paternal Grandfather      Social History     Tobacco Use   • Smoking status: Light Tobacco Smoker     Packs/day: 0.00     Types: Cigars   •  Smokeless tobacco: Never Used   • Tobacco comment: 4 cigars/year   Vaping Use   • Vaping Use: Never used   Substance Use Topics   • Alcohol use: Yes     Comment: Cocktails of 2 -3 drinks around 5-6x/week   • Drug use: No       ROS:  All other systems reviewed and are negative except as stated in the HPI       Physical Exam:     /78 (BP Location: Right arm, Patient Position: Sitting, BP Cuff Size: Adult)   Pulse 97   Temp 36.9 °C (98.5 °F) (Temporal)   Resp 14   Ht 1.829 m (6')   Wt 104 kg (229 lb)   SpO2 95%   BMI 31.06 kg/m²   General: Normal appearing. No distress.  Pulmonary: Clear to ausculation.  Normal effort.   Cardiovascular: Regular rate and rhythm    Assessment and Plan:     1. Essential hypertension  Reported check blood pressure at home with normal range, he is taking blood thinner medication as shown below, denied having related symptoms  - lisinopril-hydrochlorothiazide (PRINZIDE) 20-12.5 MG per tablet; Take 1 Tablet by mouth 2 times a day. Appointment needed prior to further refills.  Dispense: 180 Tablet; Refill: 1  - Comp Metabolic Panel; Future    -discussion in details on target blood pressure goal, advised monitoring BP closely at home.  Have BP log to present at follow-up visit or send through my chart.   -Advised low-salt diet, healthy dietary option include plenty of vegetable, reduce refine carbohydrates and sugar, regular exercise as tolerated, healthy fat/protein/carbs, also avoid alcohol, no NSAIDs  If symptoms worsen or persist patient can return to clinic for reevaluation.  Red flags and strict emergency room precautions discussed.  Discussed side effects of medication. Patient understand    2. Dyslipidemia  - LipoFit by NMR; Future    3. Elevated fasting blood sugar  - HEMOGLOBIN A1C; Future  - INSULIN FASTING; Future    4. Current smoker on some days  Reported smoking pipe occasionally    -Counseled about the negative health risks of smoking, pt expressed  understanding  -Encouraged smoking cessation  -CTM and f/u with support to quit       5. Obesity (BMI 30-39.9)  6. Screening for thyroid disorder  - FREE THYROXINE; Future  - T3 FREE; Future  - TSH; Future  - THYROID PEROXIDASE  (TPO) AB; Future    7. Encounter for vitamin deficiency screening  - VITAMIN B12; Future    8. Vitamin D deficiency  - VITAMIN D,25 HYDROXY; Future    9. Positive FIT (fecal immunochemical test)  - OCCULT BLOOD FECES IMMUNOASSAY; Future   Reported had colonoscopy before, I do not have record, reported was normal    10. Elevated hematocrit  - CBC WITH DIFFERENTIAL; Future    Vertigo  - Reported 1 year ago, was severe, bidirectional, associated with nausea and vomiting, improved, only happen when he lay down on his bed at night with tossing and turning, reported no more nausea or vomiting, reported his balance was a little bit off and he started using cane, today denied having any balance issues, Boyd-Hallpike maneuver equivocal for benign positional vertigo, reported when he do head turning exercise he gets some improvement  - Educated in detail regarding conservative management, Epley maneuver shown to him as well,  -Patient also referred to vestibular therapy  > On exam the cerebellar signs are negative, in case if he needs imaging, possibly CAT scan of the head since patient has metals that might prevent MRI of the  > ER visit precaution explained to the patient in detail.  Patient understands    Follow Up:      Return in about 4 weeks (around 8/9/2022).  Labs, blood pressure reading  Please note that this dictation was created using voice recognition software. I have made every reasonable attempt to correct obvious errors, but I expect that there are errors of grammar and possibly content that I did not discover before finalizing the note.    Signed by: Gorge Pennington M.D.

## 2022-08-08 ENCOUNTER — HOSPITAL ENCOUNTER (OUTPATIENT)
Dept: LAB | Facility: MEDICAL CENTER | Age: 72
End: 2022-08-08
Attending: INTERNAL MEDICINE
Payer: COMMERCIAL

## 2022-08-08 DIAGNOSIS — E78.5 DYSLIPIDEMIA: ICD-10-CM

## 2022-08-08 DIAGNOSIS — R73.01 ELEVATED FASTING BLOOD SUGAR: ICD-10-CM

## 2022-08-08 DIAGNOSIS — Z13.21 ENCOUNTER FOR VITAMIN DEFICIENCY SCREENING: ICD-10-CM

## 2022-08-08 DIAGNOSIS — Z13.29 SCREENING FOR THYROID DISORDER: ICD-10-CM

## 2022-08-08 DIAGNOSIS — E55.9 VITAMIN D DEFICIENCY: ICD-10-CM

## 2022-08-08 DIAGNOSIS — I10 ESSENTIAL HYPERTENSION: ICD-10-CM

## 2022-08-08 DIAGNOSIS — R71.8 ELEVATED HEMATOCRIT: ICD-10-CM

## 2022-08-08 LAB
25(OH)D3 SERPL-MCNC: 40 NG/ML (ref 30–100)
ALBUMIN SERPL BCP-MCNC: 4.4 G/DL (ref 3.2–4.9)
ALBUMIN/GLOB SERPL: 1.5 G/DL
ALP SERPL-CCNC: 54 U/L (ref 30–99)
ALT SERPL-CCNC: 59 U/L (ref 2–50)
ANION GAP SERPL CALC-SCNC: 15 MMOL/L (ref 7–16)
AST SERPL-CCNC: 56 U/L (ref 12–45)
BASOPHILS # BLD AUTO: 0.8 % (ref 0–1.8)
BASOPHILS # BLD: 0.03 K/UL (ref 0–0.12)
BILIRUB SERPL-MCNC: 0.9 MG/DL (ref 0.1–1.5)
BUN SERPL-MCNC: 24 MG/DL (ref 8–22)
CALCIUM SERPL-MCNC: 9.3 MG/DL (ref 8.5–10.5)
CHLORIDE SERPL-SCNC: 105 MMOL/L (ref 96–112)
CO2 SERPL-SCNC: 23 MMOL/L (ref 20–33)
CREAT SERPL-MCNC: 1.29 MG/DL (ref 0.5–1.4)
EOSINOPHIL # BLD AUTO: 0.06 K/UL (ref 0–0.51)
EOSINOPHIL NFR BLD: 1.6 % (ref 0–6.9)
ERYTHROCYTE [DISTWIDTH] IN BLOOD BY AUTOMATED COUNT: 44.3 FL (ref 35.9–50)
EST. AVERAGE GLUCOSE BLD GHB EST-MCNC: 114 MG/DL
FASTING STATUS PATIENT QL REPORTED: NORMAL
GFR SERPLBLD CREATININE-BSD FMLA CKD-EPI: 58 ML/MIN/1.73 M 2
GLOBULIN SER CALC-MCNC: 2.9 G/DL (ref 1.9–3.5)
GLUCOSE SERPL-MCNC: 103 MG/DL (ref 65–99)
HBA1C MFR BLD: 5.6 % (ref 4–5.6)
HCT VFR BLD AUTO: 52.7 % (ref 42–52)
HGB BLD-MCNC: 18 G/DL (ref 14–18)
IMM GRANULOCYTES # BLD AUTO: 0 K/UL (ref 0–0.11)
IMM GRANULOCYTES NFR BLD AUTO: 0 % (ref 0–0.9)
LYMPHOCYTES # BLD AUTO: 1.67 K/UL (ref 1–4.8)
LYMPHOCYTES NFR BLD: 45.1 % (ref 22–41)
MCH RBC QN AUTO: 31.9 PG (ref 27–33)
MCHC RBC AUTO-ENTMCNC: 34.2 G/DL (ref 33.7–35.3)
MCV RBC AUTO: 93.3 FL (ref 81.4–97.8)
MONOCYTES # BLD AUTO: 0.54 K/UL (ref 0–0.85)
MONOCYTES NFR BLD AUTO: 14.6 % (ref 0–13.4)
NEUTROPHILS # BLD AUTO: 1.4 K/UL (ref 1.82–7.42)
NEUTROPHILS NFR BLD: 37.9 % (ref 44–72)
NRBC # BLD AUTO: 0 K/UL
NRBC BLD-RTO: 0 /100 WBC
PLATELET # BLD AUTO: 202 K/UL (ref 164–446)
PMV BLD AUTO: 10.5 FL (ref 9–12.9)
POTASSIUM SERPL-SCNC: 3.7 MMOL/L (ref 3.6–5.5)
PROT SERPL-MCNC: 7.3 G/DL (ref 6–8.2)
RBC # BLD AUTO: 5.65 M/UL (ref 4.7–6.1)
SODIUM SERPL-SCNC: 143 MMOL/L (ref 135–145)
T3FREE SERPL-MCNC: 3.05 PG/ML (ref 2–4.4)
T4 FREE SERPL-MCNC: 1.28 NG/DL (ref 0.93–1.7)
THYROPEROXIDASE AB SERPL-ACNC: 11 IU/ML (ref 0–9)
TSH SERPL DL<=0.005 MIU/L-ACNC: 3.22 UIU/ML (ref 0.38–5.33)
VIT B12 SERPL-MCNC: 447 PG/ML (ref 211–911)
WBC # BLD AUTO: 3.7 K/UL (ref 4.8–10.8)

## 2022-08-08 PROCEDURE — 83525 ASSAY OF INSULIN: CPT

## 2022-08-08 PROCEDURE — 86376 MICROSOMAL ANTIBODY EACH: CPT

## 2022-08-08 PROCEDURE — 84481 FREE ASSAY (FT-3): CPT

## 2022-08-08 PROCEDURE — 84439 ASSAY OF FREE THYROXINE: CPT

## 2022-08-08 PROCEDURE — 80061 LIPID PANEL: CPT

## 2022-08-08 PROCEDURE — 83704 LIPOPROTEIN BLD QUAN PART: CPT

## 2022-08-08 PROCEDURE — 85025 COMPLETE CBC W/AUTO DIFF WBC: CPT

## 2022-08-08 PROCEDURE — 82306 VITAMIN D 25 HYDROXY: CPT

## 2022-08-08 PROCEDURE — 82607 VITAMIN B-12: CPT

## 2022-08-08 PROCEDURE — 80053 COMPREHEN METABOLIC PANEL: CPT

## 2022-08-08 PROCEDURE — 84443 ASSAY THYROID STIM HORMONE: CPT

## 2022-08-08 PROCEDURE — 83036 HEMOGLOBIN GLYCOSYLATED A1C: CPT

## 2022-08-09 LAB
FASTING STATUS PATIENT QL REPORTED: NORMAL
INSULIN P FAST SERPL-ACNC: 34 UIU/ML (ref 3–25)

## 2022-08-11 LAB
CHOLEST SERPL-MCNC: 178 MG/DL
FASTING STATUS PATIENT QL REPORTED: NORMAL
HDL PARTICAL NO Q4363: 23.1 UMOL/L
HDL SIZE Q4361: 8.5 NM
HDLC SERPL-MCNC: 32 MG/DL (ref 40–59)
HLD.LARGE SERPL-SCNC: <2.8 UMOL/L
L VLDL PART NO Q4357: 4.6 NMOL/L
LDL SERPL QN: 20.9 NM
LDL SERPL-SCNC: 1399 NMOL/L
LDL SMALL SERPL-SCNC: 687 NMOL/L
LDLC SERPL CALC-MCNC: 115 MG/DL
PATHOLOGY STUDY: ABNORMAL
TRIGL SERPL-MCNC: 157 MG/DL (ref 30–149)
VLDL SIZE Q4362: 50.1 NM

## 2022-08-12 ENCOUNTER — HOSPITAL ENCOUNTER (OUTPATIENT)
Facility: MEDICAL CENTER | Age: 72
End: 2022-08-12
Attending: INTERNAL MEDICINE
Payer: COMMERCIAL

## 2022-08-12 PROCEDURE — 82274 ASSAY TEST FOR BLOOD FECAL: CPT

## 2022-08-16 ENCOUNTER — APPOINTMENT (OUTPATIENT)
Dept: RADIOLOGY | Facility: IMAGING CENTER | Age: 72
End: 2022-08-16
Attending: INTERNAL MEDICINE
Payer: COMMERCIAL

## 2022-08-16 ENCOUNTER — OFFICE VISIT (OUTPATIENT)
Dept: MEDICAL GROUP | Facility: PHYSICIAN GROUP | Age: 72
End: 2022-08-16
Payer: COMMERCIAL

## 2022-08-16 VITALS
HEIGHT: 72 IN | SYSTOLIC BLOOD PRESSURE: 130 MMHG | BODY MASS INDEX: 30.88 KG/M2 | TEMPERATURE: 98.5 F | WEIGHT: 228 LBS | DIASTOLIC BLOOD PRESSURE: 80 MMHG | HEART RATE: 87 BPM | RESPIRATION RATE: 16 BRPM | OXYGEN SATURATION: 93 %

## 2022-08-16 DIAGNOSIS — M25.561 ACUTE PAIN OF RIGHT KNEE: ICD-10-CM

## 2022-08-16 DIAGNOSIS — E66.9 OBESITY (BMI 35.0-39.9 WITHOUT COMORBIDITY): ICD-10-CM

## 2022-08-16 DIAGNOSIS — R79.89 ELEVATED LFTS: ICD-10-CM

## 2022-08-16 DIAGNOSIS — E78.5 DYSLIPIDEMIA: ICD-10-CM

## 2022-08-16 DIAGNOSIS — E55.9 VITAMIN D DEFICIENCY: ICD-10-CM

## 2022-08-16 DIAGNOSIS — N28.9 IMPAIRED RENAL FUNCTION: ICD-10-CM

## 2022-08-16 DIAGNOSIS — E88.810 METABOLIC SYNDROME: ICD-10-CM

## 2022-08-16 DIAGNOSIS — Z78.9 ALCOHOL USE: ICD-10-CM

## 2022-08-16 PROCEDURE — 73564 X-RAY EXAM KNEE 4 OR MORE: CPT | Mod: TC,RT | Performed by: INTERNAL MEDICINE

## 2022-08-16 PROCEDURE — 99214 OFFICE O/P EST MOD 30 MIN: CPT | Performed by: INTERNAL MEDICINE

## 2022-08-16 ASSESSMENT — FIBROSIS 4 INDEX: FIB4 SCORE: 2.63

## 2022-08-16 NOTE — PROGRESS NOTES
Established Patient    Chief Complaint   Patient presents with    Knee Pain     R        Subjective:     HPI:   Oliverio presents today with the following.    Patient Active Problem List    Diagnosis Date Noted    Elevated LFTs 08/16/2022    Impaired renal function 08/16/2022    Dyslipidemia 07/12/2022    Vitamin D deficiency 07/12/2022    Positive FIT (fecal immunochemical test) 07/12/2022    Alcohol use 07/12/2022    Vertigo 07/12/2022    Elevated fasting blood sugar 07/16/2020    Current smoker on some days 11/08/2019    Seasonal allergic rhinitis due to pollen 05/25/2018    Chronic midline low back pain with sciatica 03/06/2018    Essential hypertension 03/06/2018    Obesity (BMI 35.0-39.9 without comorbidity) ([facility]) 03/06/2018    History of total right knee replacement 03/06/2018    Post-traumatic osteoarthritis of both knees 03/06/2018       Current Outpatient Medications on File Prior to Visit   Medication Sig Dispense Refill    lisinopril-hydrochlorothiazide (PRINZIDE) 20-12.5 MG per tablet Take 1 Tablet by mouth 2 times a day. Appointment needed prior to further refills. 180 Tablet 1     No current facility-administered medications on file prior to visit.       Allergies, past medical history, past surgical history, family history, social history reviewed and updated    ROS:  All other systems reviewed and are negative except as stated in the HPI       Physical Exam:     /80 (BP Location: Right arm, Patient Position: Sitting, BP Cuff Size: Adult)   Pulse 87   Temp 36.9 °C (98.5 °F) (Temporal)   Resp 16   Ht 1.829 m (6')   Wt 103 kg (228 lb)   SpO2 93%   BMI 30.92 kg/m²   General: Normal appearing. No distress.  Pulmonary: Clear to ausculation.  Normal effort.   Cardiovascular: Regular rate and rhythm    Assessment and Plan:     72 y.o. male with the following issues.    1. Acute pain of right knee  Reported acute pain of the right knee, with the swelling, he has a history of right knee replacement  as well, advised to follow-up with [facility] as well as his orthopedic  > ER visit precaution explained to the patient in detail.  Patient understands  - DX-KNEE COMPLETE 4+ RIGHT; Future    2. Obesity (BMI 35.0-39.9 without comorbidity) (HCC)    3. Elevated LFTs    4. Impaired renal function    5. Vitamin D deficiency    6. Dyslipidemia    7. Alcohol use  > Continue drinking 7 drinks a week, patient also has some dehydration as well as some impaired GFR compared to before  -Educated in detail regarding well hydration, avoidance of alcohol especially in the light of elevated LFT, patient also reported drinking a lot of fruit juice and other vegetable juice that has sugar,  > Elevated LFT also contributed to fatty liver, however patient reported he is losing weight as well  Patient also has some dyslipidemia    -Discussed in detail regarding healthy dietary options including plenty of vegetable, reduce refine carb's and sugar, , regular exercise as tolerated, healthy fat/protein/carbs    Follow Up:      Return in about 4 weeks (around 9/13/2022).    Please note that this dictation was created using voice recognition software. I have made every reasonable attempt to correct obvious errors, but I expect that there are errors of grammar and possibly content that I did not discover before finalizing the note.    Signed by: Gorge Pennington M.D.

## 2022-08-17 ENCOUNTER — TELEPHONE (OUTPATIENT)
Dept: MEDICAL GROUP | Facility: PHYSICIAN GROUP | Age: 72
End: 2022-08-17

## 2022-08-17 DIAGNOSIS — R19.5 POSITIVE FIT (FECAL IMMUNOCHEMICAL TEST): ICD-10-CM

## 2022-08-17 NOTE — TELEPHONE ENCOUNTER
Phone Number Called: [phone] (home) [phone] (work)      Call outcome: Did not leave a detailed message. Requested patient to call back.    Message: left vm

## 2022-08-17 NOTE — TELEPHONE ENCOUNTER
----- Message from Gorge Pennington M.D. sent at 8/16/2022  5:27 PM PDT -----  Please inform patient about below:  Imaging reported to stable knee arthroplasty, no acute abnormalities, please follow-up with [facility] as well as per orthopedic for further management.  Please take care

## 2022-08-19 LAB — IMM ASSAY OCC BLD FITOB: NEGATIVE

## 2022-10-12 ENCOUNTER — OFFICE VISIT (OUTPATIENT)
Dept: MEDICAL GROUP | Facility: PHYSICIAN GROUP | Age: 72
End: 2022-10-12
Payer: COMMERCIAL

## 2022-10-12 VITALS
RESPIRATION RATE: 14 BRPM | TEMPERATURE: 97.6 F | HEART RATE: 86 BPM | HEIGHT: 72 IN | DIASTOLIC BLOOD PRESSURE: 84 MMHG | WEIGHT: 235 LBS | SYSTOLIC BLOOD PRESSURE: 134 MMHG | BODY MASS INDEX: 31.83 KG/M2 | OXYGEN SATURATION: 93 %

## 2022-10-12 DIAGNOSIS — E88.810 METABOLIC SYNDROME: ICD-10-CM

## 2022-10-12 DIAGNOSIS — E66.9 OBESITY (BMI 35.0-39.9 WITHOUT COMORBIDITY): ICD-10-CM

## 2022-10-12 DIAGNOSIS — I10 ESSENTIAL HYPERTENSION: ICD-10-CM

## 2022-10-12 DIAGNOSIS — Z23 NEED FOR VACCINATION: ICD-10-CM

## 2022-10-12 PROCEDURE — 90472 IMMUNIZATION ADMIN EACH ADD: CPT | Performed by: INTERNAL MEDICINE

## 2022-10-12 PROCEDURE — 99214 OFFICE O/P EST MOD 30 MIN: CPT | Mod: 25 | Performed by: INTERNAL MEDICINE

## 2022-10-12 PROCEDURE — 90471 IMMUNIZATION ADMIN: CPT | Performed by: INTERNAL MEDICINE

## 2022-10-12 PROCEDURE — 90677 PCV20 VACCINE IM: CPT | Performed by: INTERNAL MEDICINE

## 2022-10-12 PROCEDURE — 90662 IIV NO PRSV INCREASED AG IM: CPT | Performed by: INTERNAL MEDICINE

## 2022-10-12 ASSESSMENT — FIBROSIS 4 INDEX: FIB4 SCORE: 2.63

## 2022-10-12 NOTE — PROGRESS NOTES
Established Patient    Chief Complaint   Patient presents with    Follow-Up       Subjective:     HPI:   Oliverio presents today with the following.    Patient Active Problem List    Diagnosis Date Noted    Elevated LFTs 08/16/2022    Impaired renal function 08/16/2022    Metabolic syndrome 08/16/2022    Dyslipidemia 07/12/2022    Vitamin D deficiency 07/12/2022    Positive FIT (fecal immunochemical test) 07/12/2022    Alcohol use 07/12/2022    Vertigo 07/12/2022    Elevated fasting blood sugar 07/16/2020    Current smoker on some days 11/08/2019    Seasonal allergic rhinitis due to pollen 05/25/2018    Chronic midline low back pain with sciatica 03/06/2018    Essential hypertension 03/06/2018    Obesity (BMI 35.0-39.9 without comorbidity) ([facility]) 03/06/2018    History of total right knee replacement 03/06/2018    Post-traumatic osteoarthritis of both knees 03/06/2018       Current Outpatient Medications on File Prior to Visit   Medication Sig Dispense Refill    lisinopril-hydrochlorothiazide (PRINZIDE) 20-12.5 MG per tablet Take 1 Tablet by mouth 2 times a day. Appointment needed prior to further refills. 180 Tablet 1     No current facility-administered medications on file prior to visit.       Allergies, past medical history, past surgical history, family history, social history reviewed and updated    ROS:  All other systems reviewed and are negative except as stated in the HPI       Physical Exam:     /84 (BP Location: Left arm, Patient Position: Sitting, BP Cuff Size: Adult)   Pulse 86   Temp 36.4 °C (97.6 °F) (Temporal)   Resp 14   Ht 1.829 m (6')   Wt 107 kg (235 lb)   SpO2 93%   BMI 31.87 kg/m²   General: Normal appearing. No distress.  Pulmonary: Clear to ausculation.  Normal effort.   Cardiovascular: Regular rate and rhythm    Assessment and Plan:     72 y.o. male with the following issues.    1. Need for vaccination  - INFLUENZA VACCINE, HIGH DOSE (65+ ONLY)  - Pneumococcal Conjugate Vaccine  20-Valent (19 yrs+)    2. Essential hypertension    3. Obesity (BMI 35.0-39.9 without comorbidity) (HCC)    4. Metabolic syndrome    Mention check blood pressure at home with normal range, patient reported compliance with lisinopril-hydrochlorothiazide 20-12.5 mg daily, denied having symptoms related    -Lengthy discussion regarding healthy dietary options including plenty of vegetable, reduce refine carb's and sugar, , regular exercise as tolerated, healthy fat/protein/carbs  - Shown pictures regarding healthy lifestyle changes and healthy dietary options  -Patient would like to start these lifestyle changes to improve obesity/overweight as well as other chronic conditions    Follow Up:      Return in about 3 months (around 1/12/2023).    Please note that this dictation was created using voice recognition software. I have made every reasonable attempt to correct obvious errors, but I expect that there are errors of grammar and possibly content that I did not discover before finalizing the note.    Signed by: Gorge Pennington M.D.

## 2023-04-30 DIAGNOSIS — I10 ESSENTIAL HYPERTENSION: ICD-10-CM

## 2023-05-03 RX ORDER — LISINOPRIL AND HYDROCHLOROTHIAZIDE 20; 12.5 MG/1; MG/1
TABLET ORAL
Qty: 180 TABLET | Refills: 0 | Status: SHIPPED
Start: 2023-05-03 | End: 2023-06-29

## 2023-05-23 ENCOUNTER — PATIENT MESSAGE (OUTPATIENT)
Dept: HEALTH INFORMATION MANAGEMENT | Facility: OTHER | Age: 73
End: 2023-05-23

## 2023-05-23 ENCOUNTER — DOCUMENTATION (OUTPATIENT)
Dept: HEALTH INFORMATION MANAGEMENT | Facility: OTHER | Age: 73
End: 2023-05-23
Payer: COMMERCIAL

## 2023-06-29 ENCOUNTER — OFFICE VISIT (OUTPATIENT)
Dept: MEDICAL GROUP | Facility: PHYSICIAN GROUP | Age: 73
End: 2023-06-29
Payer: COMMERCIAL

## 2023-06-29 VITALS
OXYGEN SATURATION: 96 % | SYSTOLIC BLOOD PRESSURE: 120 MMHG | HEART RATE: 89 BPM | HEIGHT: 72 IN | TEMPERATURE: 98.7 F | DIASTOLIC BLOOD PRESSURE: 72 MMHG | BODY MASS INDEX: 32.1 KG/M2 | RESPIRATION RATE: 14 BRPM | WEIGHT: 237 LBS

## 2023-06-29 DIAGNOSIS — K76.0 FATTY LIVER: ICD-10-CM

## 2023-06-29 DIAGNOSIS — E78.5 DYSLIPIDEMIA: ICD-10-CM

## 2023-06-29 DIAGNOSIS — R73.01 ELEVATED FASTING BLOOD SUGAR: ICD-10-CM

## 2023-06-29 DIAGNOSIS — R00.2 PALPITATIONS: ICD-10-CM

## 2023-06-29 DIAGNOSIS — Z12.5 PROSTATE CANCER SCREENING: ICD-10-CM

## 2023-06-29 DIAGNOSIS — I10 ESSENTIAL HYPERTENSION: ICD-10-CM

## 2023-06-29 PROCEDURE — 99214 OFFICE O/P EST MOD 30 MIN: CPT | Performed by: FAMILY MEDICINE

## 2023-06-29 PROCEDURE — 3078F DIAST BP <80 MM HG: CPT | Performed by: FAMILY MEDICINE

## 2023-06-29 PROCEDURE — 3074F SYST BP LT 130 MM HG: CPT | Performed by: FAMILY MEDICINE

## 2023-06-29 RX ORDER — LISINOPRIL 40 MG/1
40 TABLET ORAL DAILY
Qty: 90 TABLET | Refills: 3 | Status: SHIPPED | OUTPATIENT
Start: 2023-06-29

## 2023-06-29 ASSESSMENT — FIBROSIS 4 INDEX: FIB4 SCORE: 2.67

## 2023-06-29 ASSESSMENT — PATIENT HEALTH QUESTIONNAIRE - PHQ9: CLINICAL INTERPRETATION OF PHQ2 SCORE: 0

## 2023-06-29 NOTE — PROGRESS NOTES
CHIEF COMPLAINT / REASON FOR VISIT  Oliverio Velazquez is a 73 y.o. male that presents today to establish care.    HISTORY OF PRESENT ILLNESS  Left ear tinnitus, intermittent   - he read a research article that magnesium may help tinnitus    Heart fluttering sensation, no chest pain or dyspnea    No past surgical history on file.    Social History     Tobacco Use    Smoking status: Light Smoker     Packs/day: 0.00     Types: Cigars, Cigarettes    Smokeless tobacco: Never    Tobacco comments:     4 cigars/year   Vaping Use    Vaping Use: Never used   Substance Use Topics    Alcohol use: Yes     Comment: Cocktails of 2 -3 drinks around 5-6x/week    Drug use: No     OBJECTIVE    /72 (BP Location: Right arm, Patient Position: Sitting, BP Cuff Size: Adult)   Pulse 89   Temp 37.1 °C (98.7 °F) (Temporal)   Resp 14   Ht 1.829 m (6')   Wt 108 kg (237 lb)   SpO2 96%   BMI 32.14 kg/m²     PHYSICAL EXAM  Constitutional: Sitting comfortably, in no acute distress, responds to questions appropriately.  Head: Normocephalic  Neck: No cervical lymphadenopathy  Heart: Regular S1 S2, no murmurs, rub, or gallops  Lungs: Clear to auscultation bilaterally, no wheezes, rales, or rhonchi  Extremities: 2+ left lower extremity pitting edema to mid shin, 1+ right lower extremity pitting edema to mid shin  Skin: Warm and dry, no rashes or lesions on face or exposed upper extremities    ASSESSMENT & PLAN  1. Essential hypertension  He has left-sided ear tinnitus and is wondering if his hydrochlorothiazide is contributing.  Given that his blood pressures well controlled, it would be reasonable to trial discontinuation of hydrochlorothiazide, continuing lisinopril 40 mg daily.  We will recheck blood pressure at next follow-up visit.  He continues to monitor his pressures at home  - Comp Metabolic Panel; Future  - CBC WITH DIFFERENTIAL; Future    2. Dyslipidemia  Untreated mixed hyperlipidemia, will obtain lipid profile and follow-up in  clinic  - Lipid Profile; Future    3. Prostate cancer screening  - PROSTATE SPECIFIC AG SCREENING; Future    4. Elevated fasting blood sugar  - Comp Metabolic Panel; Future  - HEMOGLOBIN A1C; Future    5. Fatty liver  Patient reports 1-2 alcoholic drinks per day.  Given his history of elevated fasting insulin, and elevated fasting glucose, suspect probable nonalcoholic fatty liver.  We will repeat transaminases  - Comp Metabolic Panel; Future    6. Palpitations  Reports intermittent heart fluttering sensation, will obtain 14-day Zio patch to assess for arrhythmia  - [facility] ZIO PATCH MONITOR; Future    Follow-up after repeat labs

## 2023-07-10 ENCOUNTER — NON-PROVIDER VISIT (OUTPATIENT)
Dept: CARDIOLOGY | Facility: MEDICAL CENTER | Age: 73
End: 2023-07-10
Attending: FAMILY MEDICINE
Payer: COMMERCIAL

## 2023-07-10 DIAGNOSIS — I49.1 PREMATURE ATRIAL CONTRACTIONS: ICD-10-CM

## 2023-07-10 DIAGNOSIS — I49.3 PVCS (PREMATURE VENTRICULAR CONTRACTIONS): ICD-10-CM

## 2023-07-10 DIAGNOSIS — R00.2 PALPITATIONS: ICD-10-CM

## 2023-07-10 DIAGNOSIS — I47.10 SVT (SUPRAVENTRICULAR TACHYCARDIA) (HCC): ICD-10-CM

## 2023-07-10 PROCEDURE — 93246 EXT ECG>7D<15D RECORDING: CPT

## 2023-07-10 NOTE — PROGRESS NOTES
Patient enrolled in the 14 day ePatch Holter monitoring program per Dwain Stevenson MD.  >Office hook-up, serial # [id number].  >Currently pending EOS.

## 2023-07-26 ENCOUNTER — DOCUMENTATION (OUTPATIENT)
Dept: HEALTH INFORMATION MANAGEMENT | Facility: OTHER | Age: 73
End: 2023-07-26
Payer: COMMERCIAL

## 2023-07-28 ENCOUNTER — TELEPHONE (OUTPATIENT)
Dept: CARDIOLOGY | Facility: MEDICAL CENTER | Age: 73
End: 2023-07-28
Payer: COMMERCIAL

## 2023-07-31 PROCEDURE — 93248 EXT ECG>7D<15D REV&INTERPJ: CPT | Performed by: STUDENT IN AN ORGANIZED HEALTH CARE EDUCATION/TRAINING PROGRAM

## 2023-08-10 ENCOUNTER — HOSPITAL ENCOUNTER (OUTPATIENT)
Dept: LAB | Facility: MEDICAL CENTER | Age: 73
End: 2023-08-10
Attending: FAMILY MEDICINE
Payer: COMMERCIAL

## 2023-08-10 DIAGNOSIS — E78.5 DYSLIPIDEMIA: ICD-10-CM

## 2023-08-10 DIAGNOSIS — K76.0 FATTY LIVER: ICD-10-CM

## 2023-08-10 DIAGNOSIS — Z12.5 PROSTATE CANCER SCREENING: ICD-10-CM

## 2023-08-10 DIAGNOSIS — R73.01 ELEVATED FASTING BLOOD SUGAR: ICD-10-CM

## 2023-08-10 DIAGNOSIS — I10 ESSENTIAL HYPERTENSION: ICD-10-CM

## 2023-08-10 LAB
ALBUMIN SERPL BCP-MCNC: 4.3 G/DL (ref 3.2–4.9)
ALBUMIN/GLOB SERPL: 1.4 G/DL
ALP SERPL-CCNC: 60 U/L (ref 30–99)
ALT SERPL-CCNC: 35 U/L (ref 2–50)
ANION GAP SERPL CALC-SCNC: 11 MMOL/L (ref 7–16)
AST SERPL-CCNC: 26 U/L (ref 12–45)
BASOPHILS # BLD AUTO: 1.5 % (ref 0–1.8)
BASOPHILS # BLD: 0.09 K/UL (ref 0–0.12)
BILIRUB SERPL-MCNC: 1.2 MG/DL (ref 0.1–1.5)
BUN SERPL-MCNC: 17 MG/DL (ref 8–22)
CALCIUM ALBUM COR SERPL-MCNC: 9.5 MG/DL (ref 8.5–10.5)
CALCIUM SERPL-MCNC: 9.7 MG/DL (ref 8.5–10.5)
CHLORIDE SERPL-SCNC: 102 MMOL/L (ref 96–112)
CHOLEST SERPL-MCNC: 207 MG/DL (ref 100–199)
CO2 SERPL-SCNC: 26 MMOL/L (ref 20–33)
CREAT SERPL-MCNC: 1.1 MG/DL (ref 0.5–1.4)
EOSINOPHIL # BLD AUTO: 0.45 K/UL (ref 0–0.51)
EOSINOPHIL NFR BLD: 7.3 % (ref 0–6.9)
ERYTHROCYTE [DISTWIDTH] IN BLOOD BY AUTOMATED COUNT: 43.5 FL (ref 35.9–50)
EST. AVERAGE GLUCOSE BLD GHB EST-MCNC: 114 MG/DL
FASTING STATUS PATIENT QL REPORTED: NORMAL
GFR SERPLBLD CREATININE-BSD FMLA CKD-EPI: 70 ML/MIN/1.73 M 2
GLOBULIN SER CALC-MCNC: 3 G/DL (ref 1.9–3.5)
GLUCOSE SERPL-MCNC: 96 MG/DL (ref 65–99)
HBA1C MFR BLD: 5.6 % (ref 4–5.6)
HCT VFR BLD AUTO: 50.8 % (ref 42–52)
HDLC SERPL-MCNC: 42 MG/DL
HGB BLD-MCNC: 17.1 G/DL (ref 14–18)
IMM GRANULOCYTES # BLD AUTO: 0.01 K/UL (ref 0–0.11)
IMM GRANULOCYTES NFR BLD AUTO: 0.2 % (ref 0–0.9)
LDLC SERPL CALC-MCNC: 134 MG/DL
LYMPHOCYTES # BLD AUTO: 2.44 K/UL (ref 1–4.8)
LYMPHOCYTES NFR BLD: 39.6 % (ref 22–41)
MCH RBC QN AUTO: 31.7 PG (ref 27–33)
MCHC RBC AUTO-ENTMCNC: 33.7 G/DL (ref 32.3–36.5)
MCV RBC AUTO: 94.2 FL (ref 81.4–97.8)
MONOCYTES # BLD AUTO: 0.61 K/UL (ref 0–0.85)
MONOCYTES NFR BLD AUTO: 9.9 % (ref 0–13.4)
NEUTROPHILS # BLD AUTO: 2.56 K/UL (ref 1.82–7.42)
NEUTROPHILS NFR BLD: 41.5 % (ref 44–72)
NRBC # BLD AUTO: 0 K/UL
NRBC BLD-RTO: 0 /100 WBC (ref 0–0.2)
PLATELET # BLD AUTO: 258 K/UL (ref 164–446)
PMV BLD AUTO: 10.3 FL (ref 9–12.9)
POTASSIUM SERPL-SCNC: 4.1 MMOL/L (ref 3.6–5.5)
PROT SERPL-MCNC: 7.3 G/DL (ref 6–8.2)
PSA SERPL-MCNC: 3.14 NG/ML (ref 0–4)
RBC # BLD AUTO: 5.39 M/UL (ref 4.7–6.1)
SODIUM SERPL-SCNC: 139 MMOL/L (ref 135–145)
TRIGL SERPL-MCNC: 154 MG/DL (ref 0–149)
WBC # BLD AUTO: 6.2 K/UL (ref 4.8–10.8)

## 2023-08-10 PROCEDURE — 36415 COLL VENOUS BLD VENIPUNCTURE: CPT

## 2023-08-10 PROCEDURE — 85025 COMPLETE CBC W/AUTO DIFF WBC: CPT

## 2023-08-10 PROCEDURE — 84153 ASSAY OF PSA TOTAL: CPT

## 2023-08-10 PROCEDURE — 80061 LIPID PANEL: CPT

## 2023-08-10 PROCEDURE — 80053 COMPREHEN METABOLIC PANEL: CPT

## 2023-08-10 PROCEDURE — 83036 HEMOGLOBIN GLYCOSYLATED A1C: CPT

## 2023-08-17 ENCOUNTER — OFFICE VISIT (OUTPATIENT)
Dept: MEDICAL GROUP | Facility: PHYSICIAN GROUP | Age: 73
End: 2023-08-17
Payer: COMMERCIAL

## 2023-08-17 VITALS
BODY MASS INDEX: 32.23 KG/M2 | OXYGEN SATURATION: 95 % | HEIGHT: 72 IN | WEIGHT: 238 LBS | RESPIRATION RATE: 14 BRPM | HEART RATE: 80 BPM | SYSTOLIC BLOOD PRESSURE: 126 MMHG | TEMPERATURE: 98.7 F | DIASTOLIC BLOOD PRESSURE: 68 MMHG

## 2023-08-17 DIAGNOSIS — E78.5 DYSLIPIDEMIA: ICD-10-CM

## 2023-08-17 DIAGNOSIS — I47.10 PAROXYSMAL SVT (SUPRAVENTRICULAR TACHYCARDIA) (HCC): ICD-10-CM

## 2023-08-17 PROCEDURE — 3078F DIAST BP <80 MM HG: CPT | Performed by: FAMILY MEDICINE

## 2023-08-17 PROCEDURE — 99214 OFFICE O/P EST MOD 30 MIN: CPT | Performed by: FAMILY MEDICINE

## 2023-08-17 PROCEDURE — 3074F SYST BP LT 130 MM HG: CPT | Performed by: FAMILY MEDICINE

## 2023-08-17 RX ORDER — ROSUVASTATIN CALCIUM 5 MG/1
5 TABLET, COATED ORAL DAILY
Qty: 90 TABLET | Refills: 3 | Status: SHIPPED | OUTPATIENT
Start: 2023-08-17

## 2023-08-17 ASSESSMENT — FIBROSIS 4 INDEX: FIB4 SCORE: 1.26

## 2023-08-17 NOTE — PROGRESS NOTES
CHIEF COMPLAINT / REASON FOR VISIT  Oliverio Velazquez is a 73 y.o. male that presents today for lab follow-up and Zio patch.    HISTORY OF PRESENT ILLNESS  Still having intermittent palpitations, occur a couple times per week.  Zio patch showed 4 episodes of SVT over the 2-week period, longest run was 11 beats.    OBJECTIVE     /68 (BP Location: Right arm, Patient Position: Sitting, BP Cuff Size: Adult)   Pulse 80   Temp 37.1 °C (98.7 °F) (Temporal)   Resp 14   Ht 1.829 m (6')   Wt 108 kg (238 lb)   SpO2 95%   BMI 32.28 kg/m²      PHYSICAL EXAM  Constitutional: Sitting comfortably, in no acute distress, responds to questions appropriately.  Skin: Warm and dry, no rashes or lesions on face or exposed upper extremities    ASSESSMENT & PLAN  1. Dyslipidemia  Chronic, uncontrolled.  After discussion decided to initiate rosuvastatin 5 mg daily for ASCVD risk reduction.  We will repeat lipid panel annually  - rosuvastatin (CRESTOR) 5 MG Tab; Take 1 Tablet by mouth every day.  Dispense: 90 Tablet; Refill: 3    2. Paroxysmal SVT (supraventricular tachycardia) (HCC)  Chronic, symptomatic short runs of SVT. I offered beta blocker therapy for suppression but he declined at this time because the episodes are not particularly bothersome.    Annual follow up

## 2024-03-13 ENCOUNTER — OFFICE VISIT (OUTPATIENT)
Dept: URGENT CARE | Facility: PHYSICIAN GROUP | Age: 74
End: 2024-03-13
Payer: COMMERCIAL

## 2024-03-13 ENCOUNTER — APPOINTMENT (OUTPATIENT)
Dept: RADIOLOGY | Facility: IMAGING CENTER | Age: 74
End: 2024-03-13
Attending: NURSE PRACTITIONER
Payer: COMMERCIAL

## 2024-03-13 VITALS
DIASTOLIC BLOOD PRESSURE: 68 MMHG | SYSTOLIC BLOOD PRESSURE: 122 MMHG | WEIGHT: 227 LBS | TEMPERATURE: 97.8 F | RESPIRATION RATE: 18 BRPM | HEIGHT: 73 IN | HEART RATE: 93 BPM | OXYGEN SATURATION: 93 % | BODY MASS INDEX: 30.09 KG/M2

## 2024-03-13 DIAGNOSIS — S86.911A KNEE STRAIN, RIGHT, INITIAL ENCOUNTER: ICD-10-CM

## 2024-03-13 DIAGNOSIS — M25.561 ACUTE PAIN OF RIGHT KNEE: ICD-10-CM

## 2024-03-13 DIAGNOSIS — Z96.651 HISTORY OF TOTAL RIGHT KNEE REPLACEMENT: ICD-10-CM
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
## 2024-03-13 PROCEDURE — 3078F DIAST BP <80 MM HG: CPT | Performed by: NURSE PRACTITIONER

## 2024-03-13 PROCEDURE — 3074F SYST BP LT 130 MM HG: CPT | Performed by: NURSE PRACTITIONER

## 2024-03-13 PROCEDURE — 99213 OFFICE O/P EST LOW 20 MIN: CPT | Performed by: NURSE PRACTITIONER

## 2024-03-13 PROCEDURE — 73564 X-RAY EXAM KNEE 4 OR MORE: CPT | Mod: TC,RT | Performed by: NURSE PRACTITIONER

## 2024-03-13 ASSESSMENT — ENCOUNTER SYMPTOMS
CHILLS: 0
SENSORY CHANGE: 0
FALLS: 1
BRUISES/BLEEDS EASILY: 0
TINGLING: 0
FEVER: 0
WEAKNESS: 0
MYALGIAS: 1

## 2024-03-13 ASSESSMENT — FIBROSIS 4 INDEX: FIB4 SCORE: 1.28

## 2024-03-13 NOTE — PROGRESS NOTES
Subjective     Oliverio Velazquez is a 74 y.o. male who presents with Knee Pain (Right knee,painful,x1 week)            Knee Pain  Associated symptoms include myalgias. Pertinent negatives include no chills, fever, rash or weakness.    slipped and fell in the snow approximately a week and a half ago.   fell on his right knee and is experiencing severe pain.  History of total knee replacement in this knee.  Has been using pull over knee brace.  Has not taking any over-the-counter ibuprofen/Tylenol or topical pain reliever.  No ice or heat application.    PMH:  has a past medical history of Chronic midline low back pain with sciatica (3/6/2018), Essential hypertension (3/6/2018), total knee arthroplasty (03/06/2018), Hypertension, Rotator cuff arthropathy, right (2009), and S/P total knee arthroplasty (Rt side , 2002).  MEDS:   Current Outpatient Medications:     lisinopril (PRINIVIL) 40 MG tablet, Take 1 Tablet by mouth every day., Disp: 90 Tablet, Rfl: 3    rosuvastatin (CRESTOR) 5 MG Tab, Take 1 Tablet by mouth every day. (Patient not taking: Reported on 3/13/2024), Disp: 90 Tablet, Rfl: 3  ALLERGIES: No Known Allergies  SURGHX: History reviewed. No pertinent surgical history.  SOCHX:  reports that he has been smoking cigars and cigarettes. He has never used smokeless tobacco. He reports current alcohol use. He reports that he does not use drugs.  FH: Family history was reviewed, no pertinent findings to report      Review of Systems   Constitutional:  Negative for chills, fever and malaise/fatigue.   Musculoskeletal:  Positive for falls, joint pain and myalgias.   Skin:  Negative for itching and rash.   Neurological:  Negative for tingling, sensory change and weakness.   Endo/Heme/Allergies:  Does not bruise/bleed easily.   All other systems reviewed and are negative.             Objective     /68 (BP Location: Right arm, Patient Position: Sitting, BP Cuff Size: Adult long)   Pulse 93   Temp 36.6  "°C (97.8 °F) (Temporal)   Resp 18   Ht 1.854 m (6' 1\")   Wt 103 kg (227 lb) Comment: per patient  SpO2 93%   BMI 29.95 kg/m²      Physical Exam  Vitals reviewed.   Constitutional:       General: He is awake. He is not in acute distress.     Appearance: Normal appearance. He is well-developed. He is not ill-appearing, toxic-appearing or diaphoretic.   Cardiovascular:      Rate and Rhythm: Normal rate.   Pulmonary:      Effort: Pulmonary effort is normal.   Musculoskeletal:      Right knee: Bony tenderness present. No swelling, deformity, effusion, erythema, ecchymosis, lacerations or crepitus. Decreased range of motion. Tenderness present over the lateral joint line. No LCL laxity, MCL laxity, ACL laxity or PCL laxity. Normal alignment, normal meniscus and normal patellar mobility. Normal pulse.   Skin:     General: Skin is warm and dry.      Findings: Bruising present. No abrasion, erythema, signs of injury, laceration or wound.   Neurological:      Mental Status: He is alert and oriented to person, place, and time.      Sensory: Sensation is intact.      Motor: Motor function is intact.      Coordination: Coordination is intact.      Gait: Gait is intact.      Comments: Antalgic gait.   Psychiatric:         Attention and Perception: Attention normal.         Mood and Affect: Mood normal.         Speech: Speech normal.         Behavior: Behavior normal. Behavior is cooperative.                             Assessment & Plan        1. Fall due to slipping on ice or snow, initial encounter    - DX-KNEE COMPLETE 4+ RIGHT; Future    2. Acute pain of right knee    - DX-KNEE COMPLETE 4+ RIGHT; Future    3. History of total right knee replacement      4. Knee strain, right, initial encounter      -May use over the counter NSAID/Tylenol as needed for pain/swelling  -May use cool compresses for any swelling or throbbing pain as needed  -May utilize RICE method as needed, use knee brace until right knee pain has " proved  -Avoid excessive weight bearing to avoid further injury/pain  -May apply topical analgesics as needed   -Perform proper body mechanics with lifting, twisting, bending and walking  -Monitor for deformity, numbness/tingling in toes, decreased range of motion with weight bearing- need re-evaluation

## 2024-07-12 ENCOUNTER — OFFICE VISIT (OUTPATIENT)
Dept: MEDICAL GROUP | Facility: PHYSICIAN GROUP | Age: 74
End: 2024-07-12
Payer: COMMERCIAL

## 2024-07-12 VITALS
BODY MASS INDEX: 32.45 KG/M2 | OXYGEN SATURATION: 97 % | HEIGHT: 72 IN | RESPIRATION RATE: 16 BRPM | TEMPERATURE: 98.5 F | DIASTOLIC BLOOD PRESSURE: 80 MMHG | HEART RATE: 77 BPM | SYSTOLIC BLOOD PRESSURE: 128 MMHG | WEIGHT: 239.6 LBS

## 2024-07-12 DIAGNOSIS — Z87.828 HISTORY OF LACERATION OF SKIN: ICD-10-CM

## 2024-07-12 DIAGNOSIS — Z12.12 SCREENING FOR COLORECTAL CANCER: ICD-10-CM

## 2024-07-12 DIAGNOSIS — F17.200 CURRENT SMOKER ON SOME DAYS: ICD-10-CM

## 2024-07-12 DIAGNOSIS — E78.5 DYSLIPIDEMIA: ICD-10-CM

## 2024-07-12 DIAGNOSIS — E55.9 VITAMIN D DEFICIENCY: ICD-10-CM

## 2024-07-12 DIAGNOSIS — Z12.11 SCREENING FOR COLORECTAL CANCER: ICD-10-CM

## 2024-07-12 DIAGNOSIS — Z13.0 SCREENING FOR DEFICIENCY ANEMIA: ICD-10-CM

## 2024-07-12 DIAGNOSIS — Z13.29 THYROID DISORDER SCREEN: ICD-10-CM

## 2024-07-12 DIAGNOSIS — E66.9 OBESITY (BMI 35.0-39.9 WITHOUT COMORBIDITY): ICD-10-CM

## 2024-07-12 DIAGNOSIS — Z23 NEED FOR VACCINATION: ICD-10-CM

## 2024-07-12 DIAGNOSIS — I10 ESSENTIAL HYPERTENSION: ICD-10-CM

## 2024-07-12 PROCEDURE — 99214 OFFICE O/P EST MOD 30 MIN: CPT | Mod: 25

## 2024-07-12 PROCEDURE — 90471 IMMUNIZATION ADMIN: CPT

## 2024-07-12 PROCEDURE — 90715 TDAP VACCINE 7 YRS/> IM: CPT

## 2024-07-12 PROCEDURE — 3079F DIAST BP 80-89 MM HG: CPT

## 2024-07-12 PROCEDURE — 3074F SYST BP LT 130 MM HG: CPT

## 2024-07-12 RX ORDER — LISINOPRIL 40 MG/1
40 TABLET ORAL DAILY
Qty: 90 TABLET | Refills: 3 | Status: SHIPPED | OUTPATIENT
Start: 2024-07-12

## 2024-07-12 RX ORDER — CHLORAL HYDRATE 500 MG
1000 CAPSULE ORAL
COMMUNITY

## 2024-07-12 RX ORDER — MULTIVITAMIN WITH IRON
TABLET ORAL
COMMUNITY

## 2024-07-12 RX ORDER — MULTIVIT WITH MINERALS/LUTEIN
TABLET ORAL
COMMUNITY

## 2024-07-12 RX ORDER — ROSUVASTATIN CALCIUM 5 MG/1
5 TABLET, COATED ORAL DAILY
Qty: 90 TABLET | Refills: 3 | Status: SHIPPED | OUTPATIENT
Start: 2024-07-12

## 2024-07-12 ASSESSMENT — PATIENT HEALTH QUESTIONNAIRE - PHQ9: CLINICAL INTERPRETATION OF PHQ2 SCORE: 0

## 2024-07-12 ASSESSMENT — FIBROSIS 4 INDEX: FIB4 SCORE: 1.28

## 2024-09-06 ENCOUNTER — HOSPITAL ENCOUNTER (OUTPATIENT)
Dept: LAB | Facility: MEDICAL CENTER | Age: 74
End: 2024-09-06
Payer: COMMERCIAL

## 2024-09-06 DIAGNOSIS — E78.5 DYSLIPIDEMIA: ICD-10-CM

## 2024-09-06 DIAGNOSIS — Z13.29 THYROID DISORDER SCREEN: ICD-10-CM

## 2024-09-06 DIAGNOSIS — E66.9 OBESITY (BMI 35.0-39.9 WITHOUT COMORBIDITY): ICD-10-CM

## 2024-09-06 DIAGNOSIS — E55.9 VITAMIN D DEFICIENCY: ICD-10-CM

## 2024-09-06 DIAGNOSIS — Z13.0 SCREENING FOR DEFICIENCY ANEMIA: ICD-10-CM

## 2024-09-06 LAB
25(OH)D3 SERPL-MCNC: 53 NG/ML (ref 30–100)
ALBUMIN SERPL BCP-MCNC: 4.3 G/DL (ref 3.2–4.9)
ALBUMIN/GLOB SERPL: 1.3 G/DL
ALP SERPL-CCNC: 61 U/L (ref 30–99)
ALT SERPL-CCNC: 54 U/L (ref 2–50)
ANION GAP SERPL CALC-SCNC: 15 MMOL/L (ref 7–16)
AST SERPL-CCNC: 44 U/L (ref 12–45)
BILIRUB SERPL-MCNC: 0.9 MG/DL (ref 0.1–1.5)
BUN SERPL-MCNC: 18 MG/DL (ref 8–22)
CALCIUM ALBUM COR SERPL-MCNC: 10.2 MG/DL (ref 8.5–10.5)
CALCIUM SERPL-MCNC: 10.4 MG/DL (ref 8.5–10.5)
CHLORIDE SERPL-SCNC: 106 MMOL/L (ref 96–112)
CHOLEST SERPL-MCNC: 153 MG/DL (ref 100–199)
CO2 SERPL-SCNC: 22 MMOL/L (ref 20–33)
CREAT SERPL-MCNC: 1.08 MG/DL (ref 0.5–1.4)
ERYTHROCYTE [DISTWIDTH] IN BLOOD BY AUTOMATED COUNT: 42.4 FL (ref 35.9–50)
EST. AVERAGE GLUCOSE BLD GHB EST-MCNC: 117 MG/DL
FASTING STATUS PATIENT QL REPORTED: NORMAL
GFR SERPLBLD CREATININE-BSD FMLA CKD-EPI: 71 ML/MIN/1.73 M 2
GLOBULIN SER CALC-MCNC: 3.2 G/DL (ref 1.9–3.5)
GLUCOSE SERPL-MCNC: 104 MG/DL (ref 65–99)
HBA1C MFR BLD: 5.7 % (ref 4–5.6)
HCT VFR BLD AUTO: 50.7 % (ref 42–52)
HDLC SERPL-MCNC: 49 MG/DL
HGB BLD-MCNC: 17.6 G/DL (ref 14–18)
LDLC SERPL CALC-MCNC: 73 MG/DL
MCH RBC QN AUTO: 32.7 PG (ref 27–33)
MCHC RBC AUTO-ENTMCNC: 34.7 G/DL (ref 32.3–36.5)
MCV RBC AUTO: 94.2 FL (ref 81.4–97.8)
PLATELET # BLD AUTO: 248 K/UL (ref 164–446)
PMV BLD AUTO: 10.5 FL (ref 9–12.9)
POTASSIUM SERPL-SCNC: 4 MMOL/L (ref 3.6–5.5)
PROT SERPL-MCNC: 7.5 G/DL (ref 6–8.2)
RBC # BLD AUTO: 5.38 M/UL (ref 4.7–6.1)
SODIUM SERPL-SCNC: 143 MMOL/L (ref 135–145)
TRIGL SERPL-MCNC: 154 MG/DL (ref 0–149)
TSH SERPL-ACNC: 2.86 UIU/ML (ref 0.35–5.5)
WBC # BLD AUTO: 7 K/UL (ref 4.8–10.8)

## 2024-09-06 PROCEDURE — 85027 COMPLETE CBC AUTOMATED: CPT

## 2024-09-06 PROCEDURE — 36415 COLL VENOUS BLD VENIPUNCTURE: CPT

## 2024-09-06 PROCEDURE — 82306 VITAMIN D 25 HYDROXY: CPT

## 2024-09-06 PROCEDURE — 84443 ASSAY THYROID STIM HORMONE: CPT

## 2024-09-06 PROCEDURE — 80053 COMPREHEN METABOLIC PANEL: CPT

## 2024-09-06 PROCEDURE — 80061 LIPID PANEL: CPT

## 2024-09-06 PROCEDURE — 83036 HEMOGLOBIN GLYCOSYLATED A1C: CPT

## 2024-09-09 SDOH — ECONOMIC STABILITY: FOOD INSECURITY: WITHIN THE PAST 12 MONTHS, YOU WORRIED THAT YOUR FOOD WOULD RUN OUT BEFORE YOU GOT MONEY TO BUY MORE.: NEVER TRUE

## 2024-09-09 SDOH — ECONOMIC STABILITY: INCOME INSECURITY: IN THE LAST 12 MONTHS, WAS THERE A TIME WHEN YOU WERE NOT ABLE TO PAY THE MORTGAGE OR RENT ON TIME?: NO

## 2024-09-09 SDOH — HEALTH STABILITY: PHYSICAL HEALTH: ON AVERAGE, HOW MANY DAYS PER WEEK DO YOU ENGAGE IN MODERATE TO STRENUOUS EXERCISE (LIKE A BRISK WALK)?: 2 DAYS

## 2024-09-09 SDOH — ECONOMIC STABILITY: INCOME INSECURITY: HOW HARD IS IT FOR YOU TO PAY FOR THE VERY BASICS LIKE FOOD, HOUSING, MEDICAL CARE, AND HEATING?: NOT VERY HARD

## 2024-09-09 SDOH — HEALTH STABILITY: PHYSICAL HEALTH: ON AVERAGE, HOW MANY MINUTES DO YOU ENGAGE IN EXERCISE AT THIS LEVEL?: 20 MIN

## 2024-09-09 SDOH — ECONOMIC STABILITY: FOOD INSECURITY: WITHIN THE PAST 12 MONTHS, THE FOOD YOU BOUGHT JUST DIDN'T LAST AND YOU DIDN'T HAVE MONEY TO GET MORE.: NEVER TRUE

## 2024-09-09 ASSESSMENT — SOCIAL DETERMINANTS OF HEALTH (SDOH)
HOW HARD IS IT FOR YOU TO PAY FOR THE VERY BASICS LIKE FOOD, HOUSING, MEDICAL CARE, AND HEATING?: NOT VERY HARD
IN A TYPICAL WEEK, HOW MANY TIMES DO YOU TALK ON THE PHONE WITH FAMILY, FRIENDS, OR NEIGHBORS?: TWICE A WEEK
IN THE PAST 12 MONTHS, HAS THE ELECTRIC, GAS, OIL, OR WATER COMPANY THREATENED TO SHUT OFF SERVICE IN YOUR HOME?: NO
DO YOU BELONG TO ANY CLUBS OR ORGANIZATIONS SUCH AS CHURCH GROUPS UNIONS, FRATERNAL OR ATHLETIC GROUPS, OR SCHOOL GROUPS?: YES
DO YOU BELONG TO ANY CLUBS OR ORGANIZATIONS SUCH AS CHURCH GROUPS UNIONS, FRATERNAL OR ATHLETIC GROUPS, OR SCHOOL GROUPS?: YES
HOW OFTEN DO YOU ATTEND CHURCH OR RELIGIOUS SERVICES?: PATIENT DECLINED
HOW OFTEN DO YOU HAVE SIX OR MORE DRINKS ON ONE OCCASION: NEVER
HOW OFTEN DO YOU ATTENT MEETINGS OF THE CLUB OR ORGANIZATION YOU BELONG TO?: 1 TO 4 TIMES PER YEAR
HOW OFTEN DO YOU GET TOGETHER WITH FRIENDS OR RELATIVES?: THREE TIMES A WEEK
HOW OFTEN DO YOU GET TOGETHER WITH FRIENDS OR RELATIVES?: THREE TIMES A WEEK
HOW OFTEN DO YOU ATTENT MEETINGS OF THE CLUB OR ORGANIZATION YOU BELONG TO?: 1 TO 4 TIMES PER YEAR
HOW OFTEN DO YOU ATTEND CHURCH OR RELIGIOUS SERVICES?: PATIENT DECLINED
HOW MANY DRINKS CONTAINING ALCOHOL DO YOU HAVE ON A TYPICAL DAY WHEN YOU ARE DRINKING: 3 OR 4
WITHIN THE PAST 12 MONTHS, YOU WORRIED THAT YOUR FOOD WOULD RUN OUT BEFORE YOU GOT THE MONEY TO BUY MORE: NEVER TRUE
IN A TYPICAL WEEK, HOW MANY TIMES DO YOU TALK ON THE PHONE WITH FAMILY, FRIENDS, OR NEIGHBORS?: TWICE A WEEK
HOW OFTEN DO YOU HAVE A DRINK CONTAINING ALCOHOL: 2-3 TIMES A WEEK

## 2024-09-09 ASSESSMENT — LIFESTYLE VARIABLES
HOW MANY STANDARD DRINKS CONTAINING ALCOHOL DO YOU HAVE ON A TYPICAL DAY: 3 OR 4
HOW OFTEN DO YOU HAVE A DRINK CONTAINING ALCOHOL: 2-3 TIMES A WEEK
SKIP TO QUESTIONS 9-10: 0
AUDIT-C TOTAL SCORE: 4
HOW OFTEN DO YOU HAVE SIX OR MORE DRINKS ON ONE OCCASION: NEVER

## 2024-09-11 ENCOUNTER — OFFICE VISIT (OUTPATIENT)
Dept: MEDICAL GROUP | Facility: PHYSICIAN GROUP | Age: 74
End: 2024-09-11
Payer: COMMERCIAL

## 2024-09-11 VITALS
HEART RATE: 62 BPM | RESPIRATION RATE: 18 BRPM | TEMPERATURE: 97.9 F | HEIGHT: 72 IN | SYSTOLIC BLOOD PRESSURE: 120 MMHG | WEIGHT: 233 LBS | BODY MASS INDEX: 31.56 KG/M2 | DIASTOLIC BLOOD PRESSURE: 79 MMHG | OXYGEN SATURATION: 95 %

## 2024-09-11 DIAGNOSIS — E66.9 OBESITY (BMI 30-39.9): ICD-10-CM

## 2024-09-11 DIAGNOSIS — R60.9 EDEMA, UNSPECIFIED TYPE: ICD-10-CM

## 2024-09-11 DIAGNOSIS — I10 ESSENTIAL HYPERTENSION: ICD-10-CM

## 2024-09-11 DIAGNOSIS — R42 VERTIGO: ICD-10-CM

## 2024-09-11 DIAGNOSIS — Z76.89 ENCOUNTER TO ESTABLISH CARE: ICD-10-CM

## 2024-09-11 DIAGNOSIS — E78.5 DYSLIPIDEMIA: ICD-10-CM

## 2024-09-11 PROCEDURE — 99214 OFFICE O/P EST MOD 30 MIN: CPT

## 2024-09-11 PROCEDURE — 3074F SYST BP LT 130 MM HG: CPT

## 2024-09-11 PROCEDURE — 3078F DIAST BP <80 MM HG: CPT

## 2024-09-11 RX ORDER — HYDROCHLOROTHIAZIDE 12.5 MG/1
12.5 TABLET ORAL DAILY
Qty: 100 TABLET | Refills: 3 | Status: SHIPPED | OUTPATIENT
Start: 2024-09-11

## 2024-09-11 ASSESSMENT — ENCOUNTER SYMPTOMS: DIZZINESS: 1

## 2024-09-11 ASSESSMENT — FIBROSIS 4 INDEX: FIB4 SCORE: 1.81

## 2024-09-11 NOTE — ASSESSMENT & PLAN NOTE
Chronic, intermittent. Reports symptoms with standing up quickly/position change. Reports improved symptoms with epley maneuver.

## 2024-09-11 NOTE — ASSESSMENT & PLAN NOTE
Chronic, unstable. BP in clinic today 144/80, recheck by me 120/79. Discussed healthy lifestyle recommendations.   Prescribed lisinopril 40 mg daily.  Taking medications as directed without negative side effects  Denies headache,  vision changes, chest pain, palpitations, shortness of breath, swelling to lower extremities.   Reports intermittent dizziness  States tinnitus has improved since stopping hctz    Continue lisinopril 40 mg daily  2+edema to BLE, will order echo, resume hctz 12.5 mg daily.    Orders:    EC-ECHOCARDIOGRAM COMPLETE W/O CONT; Future    hydroCHLOROthiazide 12.5 MG tablet; Take 1 Tablet by mouth every day.

## 2024-09-11 NOTE — ASSESSMENT & PLAN NOTE
Chronic, improving. Continue healthy lifestyle recommendations, continue rosuvastatin 5 mg daily.  The ASCVD Risk score (Rose GRULLON, et al., 2019) failed to calculate.

## 2024-09-11 NOTE — PROGRESS NOTES
Subjective:     CC: Diagnoses of Essential hypertension, Dyslipidemia, Obesity (BMI 30-39.9), Vertigo, Encounter to establish care, and Edema, unspecified type were pertinent to this visit.    HPI:   Oliverio presents today with    Problem   Dyslipidemia   Vertigo   Essential Hypertension   Obesity (Bmi 30-39.9)     ROS:  Review of Systems   Neurological:  Positive for dizziness.   All other systems reviewed and are negative.      Objective:     Exam:  /79 (BP Location: Left arm, Patient Position: Sitting, BP Cuff Size: Adult)   Pulse 62   Temp 36.6 °C (97.9 °F) (Temporal)   Resp 18   Ht 1.829 m (6')   Wt 106 kg (233 lb)   SpO2 95%   BMI 31.60 kg/m²  Body mass index is 31.6 kg/m².    Physical Exam  Vitals reviewed.   Constitutional:       General: He is not in acute distress.     Appearance: Normal appearance. He is obese. He is not ill-appearing.   HENT:      Head: Normocephalic and atraumatic.      Right Ear: Tympanic membrane, ear canal and external ear normal.      Left Ear: Tympanic membrane, ear canal and external ear normal.      Nose: Nose normal.      Mouth/Throat:      Mouth: Mucous membranes are moist.      Pharynx: Oropharynx is clear.   Eyes:      Extraocular Movements: Extraocular movements intact.      Conjunctiva/sclera: Conjunctivae normal.      Pupils: Pupils are equal, round, and reactive to light.   Neck:      Thyroid: No thyromegaly.      Trachea: Trachea normal.   Cardiovascular:      Rate and Rhythm: Normal rate and regular rhythm.      Pulses: Normal pulses.      Heart sounds: Normal heart sounds. No murmur heard.  Pulmonary:      Effort: Pulmonary effort is normal. No respiratory distress.      Breath sounds: Normal breath sounds. No wheezing.   Abdominal:      General: Bowel sounds are normal.      Palpations: Abdomen is soft.   Musculoskeletal:         General: No swelling, tenderness or deformity. Normal range of motion.      Cervical back: Full passive range of motion without  pain.      Right lower le+ Pitting Edema present.      Left lower le+ Pitting Edema present.   Lymphadenopathy:      Cervical: No cervical adenopathy.   Skin:     General: Skin is warm and dry.      Capillary Refill: Capillary refill takes less than 2 seconds.   Neurological:      General: No focal deficit present.      Mental Status: He is alert and oriented to person, place, and time.      Cranial Nerves: No cranial nerve deficit.      Sensory: No sensory deficit.      Motor: No weakness.   Psychiatric:         Mood and Affect: Mood normal.         Behavior: Behavior normal.         Labs:    Latest Reference Range & Units 24 09:50   WBC 4.8 - 10.8 K/uL 7.0   RBC 4.70 - 6.10 M/uL 5.38   Hemoglobin 14.0 - 18.0 g/dL 17.6   Hematocrit 42.0 - 52.0 % 50.7   MCV 81.4 - 97.8 fL 94.2   MCH 27.0 - 33.0 pg 32.7   MCHC 32.3 - 36.5 g/dL 34.7   RDW 35.9 - 50.0 fL 42.4   Platelet Count 164 - 446 K/uL 248   MPV 9.0 - 12.9 fL 10.5   Sodium 135 - 145 mmol/L 143   Potassium 3.6 - 5.5 mmol/L 4.0   Chloride 96 - 112 mmol/L 106   Co2 20 - 33 mmol/L 22   Anion Gap 7.0 - 16.0  15.0   Glucose 65 - 99 mg/dL 104 (H)   Bun 8 - 22 mg/dL 18   Creatinine 0.50 - 1.40 mg/dL 1.08   GFR (CKD-EPI) >60 mL/min/1.73 m 2 71   Calcium 8.5 - 10.5 mg/dL 10.4   Correct Calcium 8.5 - 10.5 mg/dL 10.2   AST(SGOT) 12 - 45 U/L 44   ALT(SGPT) 2 - 50 U/L 54 (H)   Alkaline Phosphatase 30 - 99 U/L 61   Total Bilirubin 0.1 - 1.5 mg/dL 0.9   Albumin 3.2 - 4.9 g/dL 4.3   Total Protein 6.0 - 8.2 g/dL 7.5   Globulin 1.9 - 3.5 g/dL 3.2   A-G Ratio g/dL 1.3   Glycohemoglobin 4.0 - 5.6 % 5.7 (H)   Estim. Avg Glu mg/dL 117   Fasting Status  Fasting   Cholesterol,Tot 100 - 199 mg/dL 153   Triglycerides 0 - 149 mg/dL 154 (H)   HDL >=40 mg/dL 49   LDL <100 mg/dL 73   25-Hydroxy   Vitamin D 25 30 - 100 ng/mL 53   TSH 0.350 - 5.500 uIU/mL 2.860   (H): Data is abnormally high    Assessment & Plan:     74 y.o. male with the following -     Assessment & Plan  Essential  hypertension  Chronic, unstable. BP in clinic today 144/80, recheck by me 120/79. Discussed healthy lifestyle recommendations.   Prescribed lisinopril 40 mg daily.  Taking medications as directed without negative side effects  Denies headache,  vision changes, chest pain, palpitations, shortness of breath, swelling to lower extremities.   Reports intermittent dizziness  States tinnitus has improved since stopping hctz    Continue lisinopril 40 mg daily  2+edema to BLE, will order echo, resume hctz 12.5 mg daily.    Orders:    EC-ECHOCARDIOGRAM COMPLETE W/O CONT; Future    hydroCHLOROthiazide 12.5 MG tablet; Take 1 Tablet by mouth every day.    Dyslipidemia  Chronic, improving. Continue healthy lifestyle recommendations, continue rosuvastatin 5 mg daily.  The ASCVD Risk score (Rose DK, et al., 2019) failed to calculate.           Obesity (BMI 30-39.9)  Chronic, improving. Continue healthy lifestyle recommendations. Reports improved diet, eating mediterranean diet          Vertigo  Chronic, intermittent. Reports symptoms with standing up quickly/position change. Reports improved symptoms with epley maneuver.          Encounter to establish care         Edema, unspecified type    Orders:    EC-ECHOCARDIOGRAM COMPLETE W/O CONT; Future    hydroCHLOROthiazide 12.5 MG tablet; Take 1 Tablet by mouth every day.       Patient was educated in proper administration of medication(s) ordered today including safety, possible SE, risks, benefits, rationale and alternatives to therapy.   Supportive care, differential diagnoses, and indications for immediate follow-up discussed with patient.    Pathogenesis of diagnosis discussed including typical length and natural progression.    Instructed to return to clinic or nearest emergency department for any change in condition, further concerns, or worsening of symptoms.  Patient states understanding of the plan of care and discharge instructions.    Return in about 6 months (around  3/11/2025), or if symptoms worsen or fail to improve, for Blood Pressure.    Please note that this dictation was created using voice recognition software. I have made every reasonable attempt to correct obvious errors, but I expect that there are errors of grammar and possibly content that I did not discover before finalizing the note.

## 2024-09-11 NOTE — ASSESSMENT & PLAN NOTE
Chronic, improving. Continue healthy lifestyle recommendations. Reports improved diet, eating mediterranean diet

## 2025-03-11 DIAGNOSIS — E78.5 DYSLIPIDEMIA: ICD-10-CM

## 2025-03-11 NOTE — TELEPHONE ENCOUNTER
Received request via: Pharmacy    Was the patient seen in the last year in this department? Yes    Does the patient have an active prescription (recently filled or refills available) for medication(s) requested? No    Pharmacy Name:   [facility] [id number] - [address] [place] [address]  Phone: [phone] Fax: [phone]       Does the patient have retirement Plus and need 100-day supply? (This applies to ALL medications) Patient does not have SCP

## 2025-03-12 ENCOUNTER — OFFICE VISIT (OUTPATIENT)
Dept: MEDICAL GROUP | Facility: PHYSICIAN GROUP | Age: 75
End: 2025-03-12
Payer: COMMERCIAL

## 2025-03-12 VITALS
HEIGHT: 73 IN | BODY MASS INDEX: 30.09 KG/M2 | HEART RATE: 84 BPM | OXYGEN SATURATION: 94 % | DIASTOLIC BLOOD PRESSURE: 72 MMHG | WEIGHT: 227 LBS | SYSTOLIC BLOOD PRESSURE: 110 MMHG | TEMPERATURE: 98.8 F | RESPIRATION RATE: 20 BRPM

## 2025-03-12 DIAGNOSIS — R20.0 NUMBNESS AND TINGLING OF BOTH FEET: ICD-10-CM

## 2025-03-12 DIAGNOSIS — R73.09 ELEVATED HEMOGLOBIN A1C: ICD-10-CM

## 2025-03-12 DIAGNOSIS — Z23 NEED FOR VACCINATION: ICD-10-CM

## 2025-03-12 DIAGNOSIS — E78.5 DYSLIPIDEMIA: ICD-10-CM

## 2025-03-12 DIAGNOSIS — I10 ESSENTIAL HYPERTENSION: ICD-10-CM

## 2025-03-12 DIAGNOSIS — R79.89 ELEVATED LFTS: ICD-10-CM

## 2025-03-12 DIAGNOSIS — Z12.5 ENCOUNTER FOR SCREENING FOR MALIGNANT NEOPLASM OF PROSTATE: ICD-10-CM

## 2025-03-12 DIAGNOSIS — R20.2 NUMBNESS AND TINGLING OF BOTH FEET: ICD-10-CM

## 2025-03-12 DIAGNOSIS — Z13.6 SCREENING FOR CARDIOVASCULAR CONDITION: ICD-10-CM

## 2025-03-12 DIAGNOSIS — E66.9 OBESITY (BMI 30-39.9): ICD-10-CM

## 2025-03-12 PROCEDURE — 90662 IIV NO PRSV INCREASED AG IM: CPT

## 2025-03-12 PROCEDURE — 99214 OFFICE O/P EST MOD 30 MIN: CPT | Mod: 25

## 2025-03-12 PROCEDURE — 3074F SYST BP LT 130 MM HG: CPT

## 2025-03-12 PROCEDURE — 90471 IMMUNIZATION ADMIN: CPT

## 2025-03-12 PROCEDURE — 3078F DIAST BP <80 MM HG: CPT

## 2025-03-12 RX ORDER — VITAMIN B COMPLEX
1000 TABLET ORAL DAILY
COMMUNITY

## 2025-03-12 RX ORDER — GABAPENTIN 100 MG/1
100 CAPSULE ORAL 3 TIMES DAILY
Qty: 90 CAPSULE | Refills: 3 | Status: SHIPPED | OUTPATIENT
Start: 2025-03-12

## 2025-03-12 ASSESSMENT — ENCOUNTER SYMPTOMS: TINGLING: 1

## 2025-03-12 ASSESSMENT — FIBROSIS 4 INDEX: FIB4 SCORE: 1.83

## 2025-03-12 ASSESSMENT — PATIENT HEALTH QUESTIONNAIRE - PHQ9: CLINICAL INTERPRETATION OF PHQ2 SCORE: 0

## 2025-03-12 NOTE — ASSESSMENT & PLAN NOTE
Reports constant tingling from ball of foot to tips of toes bilaterally, as well as feet always feel cold. Onset a couple months ago, has not improved/worsened since onset.    Denies issues with balance  Monofilament testing with a 10 gram force: sensation intact: decreased bilaterally reduced sensation to 1st toe, ball of foot (8/10 left, 7/10 right sites tested/felt)  Visual Inspection: Feet without maceration, ulcers, fissures. Positive for fungal infection  Pedal pulses: intact bilaterally, CRT 3 sec bilat

## 2025-03-12 NOTE — PROGRESS NOTES
Verbal consent was acquired by the patient to use [employer] ambient listening note generation during this visit     Subjective:     CC: Diagnoses of Need for vaccination, Obesity (BMI 30-39.9), Numbness and tingling of both feet, Essential hypertension, Dyslipidemia, Elevated LFTs, Screening for cardiovascular condition, Encounter for screening for malignant neoplasm of prostate, and Elevated hemoglobin A1c were pertinent to this visit.    HPI:   Oliverio presents today with    History of Present Illness  The patient is a 75-year-old male who presents for evaluation of peripheral neuropathy, prediabetes, vertigo, and health maintenance.    He reports persistent coldness and tingling in his feet, symptoms he attributes to peripheral neuropathy rather than diabetes. He does not experience any burning sensation but describes a constant tingling extending from his feet to his toes. These symptoms have been present for several months, with no noted improvement or worsening. He has expressed interest in exploring pharmacological options for nerve stimulation. He does not report any balance issues.    He has a history of vertigo, for which he has previously received treatment, including Epley maneuvers. He occasionally experiences dizziness, which he believes may be a side effect of his antihypertensive medication.    He has been diagnosed as prediabetic and has made dietary modifications, including increasing his protein intake and reducing his consumption of sugar and carbohydrates. He has also abstained from alcohol. He has been maintaining his weight.    He has undergone cataract surgery in both eyes. He did not get the MRI that Dr. Yuan ordered in June 2024 because he can not do closed MRIs. He is not claustrophobic, but he goes nuts if he does that. He has been seeing Dr. Yuan for his knees. He has not had to have a Monovisc injection in his knee for 2 years. He takes magnesium with vitamin D, vitamin C, fish oil,  "Centrum Silver, and drinks a protein drink. He gets another vitamin shot with V8 juice that is low in sugar but has all kinds of fruit and vegetables. He has been taking Cholesterol Pro. He takes his blood pressure pill and water pill at the same time. Around noon, he takes all his vitamins and drinks his protein drink. He does not need a refill on his blood pressure medicine. He just got that done. His cholesterol medicine is due today, and he has to pick that up today.    SOCIAL HISTORY  He has completely cut out alcohol.    MEDICATIONS  magnesium with vitamin D, vitamin C, fish oil, Centrum Silver, Cholesterol Pro    Current Outpatient Medications   Medication Sig Dispense Refill    vitamin D3 (CHOLECALCIFEROL) 1000 Unit (25 mcg) Tab Take 1,000 Units by mouth every day.      gabapentin (NEURONTIN) 100 MG Cap Take 1 Capsule by mouth 3 times a day. 90 Capsule 3    Potassium 99 MG Tab Take  by mouth.      hydroCHLOROthiazide 12.5 MG tablet Take 1 Tablet by mouth every day. 100 Tablet 3    Omega-3 Fatty Acids (FISH OIL) 1000 MG Cap capsule Take 1,000 mg by mouth 3 times a day with meals.      Ascorbic Acid (VITAMIN C) 1000 MG Tab Take  by mouth.      Magnesium 250 MG Tab Take  by mouth.      multivitamin Tab Take 1 Tablet by mouth every day.      lisinopril (PRINIVIL) 40 MG tablet Take 1 Tablet by mouth every day. 90 Tablet 3    rosuvastatin (CRESTOR) 5 MG Tab Take 1 Tablet by mouth every day. 90 Tablet 3     No current facility-administered medications for this visit.       Problem   Numbness and Tingling of Both Feet   Dyslipidemia   Essential Hypertension   Obesity (Bmi 30-39.9)     ROS:  Review of Systems   Neurological:  Positive for tingling.   All other systems reviewed and are negative.      Objective:     Exam:  /72 (BP Location: Left arm, Patient Position: Sitting, BP Cuff Size: Adult)   Pulse 84   Temp 37.1 °C (98.8 °F) (Temporal)   Resp 20   Ht 1.854 m (6' 1\")   Wt 103 kg (227 lb)   SpO2 94%  "  BMI 29.95 kg/m²  Body mass index is 29.95 kg/m².    Physical Exam  Vitals reviewed.   Constitutional:       General: He is not in acute distress.     Appearance: Normal appearance. He is not ill-appearing.   HENT:      Head: Normocephalic and atraumatic.   Cardiovascular:      Rate and Rhythm: Normal rate and regular rhythm.      Pulses: Normal pulses.      Heart sounds: Normal heart sounds.   Pulmonary:      Effort: Pulmonary effort is normal. No respiratory distress.      Breath sounds: Normal breath sounds. No wheezing.   Abdominal:      General: Bowel sounds are normal.      Palpations: Abdomen is soft.   Skin:     General: Skin is warm and dry.      Findings: No rash.   Neurological:      General: No focal deficit present.      Mental Status: He is alert and oriented to person, place, and time.   Psychiatric:         Mood and Affect: Mood normal.         Behavior: Behavior normal.       Assessment & Plan:     75 y.o. male with the following -     Assessment & Plan  1. Peripheral neuropathy.  He reports symptoms of coldness and tingling in his feet, which have persisted for the last couple of months. A monofilament exam revealed reduced sensation in the toes and the ball of the foot. Gabapentin will be initiated at a low dose, to be taken once daily at night. Potential side effects, including dizziness and fatigue, were discussed. He is advised to keep his feet warm.    2. Prediabetes.  He has been making dietary changes, including increasing protein intake and reducing sugar and carbohydrates. His weight has decreased from 233 to 227 pounds, moving him out of the obese category. A metabolic panel and blood sugar levels will be checked before the next visit.    3. Vertigo.  He experiences occasional dizziness, which he sometimes attributes to his blood pressure medication. He continues to perform Epley maneuvers for symptom management.    4. Health maintenance.  An echocardiogram and ultrasound have been  ordered. He is advised to maintain adequate hydration prior to his next visit. Laboratory tests, including PSA, metabolic panel, kidney and liver function, and blood glucose, will be conducted within a month before the next appointment.    Follow-up  The patient will follow up in 6 months, or earlier if necessary.    PROCEDURE  The patient has undergone cataract surgery in both eyes.    Problem List Items Addressed This Visit          Family Medicine Problems    Essential hypertension    Chronic, stable. Continue lisinopril 40 mg daily, hctz 12.5 mg daily         Relevant Orders    Comp Metabolic Panel       Other    Obesity (BMI 30-39.9)    Chronic, improving. Reports improved nutrition.   Continue healthy lifestyle recommendations.          Relevant Orders    HEMOGLOBIN A1C    CBC WITHOUT DIFFERENTIAL    Lipid Profile    Dyslipidemia    Chronic, ongoing. Continue rosuvastatin 5 mg daily, fish oil, healthy lifestyle recommendations, consider adding fenofibrate.           Relevant Orders    Lipid Profile    Elevated LFTs    Relevant Orders    Comp Metabolic Panel    Numbness and tingling of both feet    Reports constant tingling from ball of foot to tips of toes bilaterally, as well as feet always feel cold. Onset a couple months ago, has not improved/worsened since onset.    Denies issues with balance  Monofilament testing with a 10 gram force: sensation intact: decreased bilaterally reduced sensation to 1st toe, ball of foot (8/10 left, 7/10 right sites tested/felt)  Visual Inspection: Feet without maceration, ulcers, fissures. Positive for fungal infection  Pedal pulses: intact bilaterally, CRT 3 sec bilat           Relevant Medications    gabapentin (NEURONTIN) 100 MG Cap     Other Visit Diagnoses         Need for vaccination        Relevant Orders    INFLUENZA VACCINE, HIGH DOSE (65+ ONLY)      Screening for cardiovascular condition        Relevant Orders    HEMOGLOBIN A1C    Comp Metabolic Panel    Lipid  Profile      Encounter for screening for malignant neoplasm of prostate        Relevant Orders    PROSTATE SPECIFIC AG SCREENING      Elevated hemoglobin A1c        Relevant Orders    HEMOGLOBIN A1C          Patient was educated in proper administration of medication(s) ordered today including safety, possible SE, risks, benefits, rationale and alternatives to therapy.   Supportive care, differential diagnoses, and indications for immediate follow-up discussed with patient.    Pathogenesis of diagnosis discussed including typical length and natural progression.    Instructed to return to clinic or nearest emergency department for any change in condition, further concerns, or worsening of symptoms.  Patient states understanding of the plan of care and discharge instructions.    Return in about 6 months (around 9/12/2025) for Labs.    Please note that this dictation was created using voice recognition software. I have made every reasonable attempt to correct obvious errors, but I expect that there are errors of grammar and possibly content that I did not discover before finalizing the note.

## 2025-03-12 NOTE — ASSESSMENT & PLAN NOTE
Chronic, ongoing. Continue rosuvastatin 5 mg daily, fish oil, healthy lifestyle recommendations, consider adding fenofibrate.

## 2025-03-13 RX ORDER — ROSUVASTATIN CALCIUM 5 MG/1
5 TABLET, COATED ORAL DAILY
Qty: 90 TABLET | Refills: 3 | Status: SHIPPED | OUTPATIENT
Start: 2025-03-13

## 2025-05-05 ENCOUNTER — APPOINTMENT (OUTPATIENT)
Dept: CARDIOLOGY | Facility: MEDICAL CENTER | Age: 75
End: 2025-05-05
Payer: COMMERCIAL

## 2025-05-05 ENCOUNTER — APPOINTMENT (OUTPATIENT)
Dept: RADIOLOGY | Facility: MEDICAL CENTER | Age: 75
End: 2025-05-05
Payer: COMMERCIAL